# Patient Record
Sex: FEMALE | Race: BLACK OR AFRICAN AMERICAN | Employment: FULL TIME | URBAN - METROPOLITAN AREA
[De-identification: names, ages, dates, MRNs, and addresses within clinical notes are randomized per-mention and may not be internally consistent; named-entity substitution may affect disease eponyms.]

---

## 2017-02-21 ENCOUNTER — OFFICE VISIT (OUTPATIENT)
Dept: FAMILY MEDICINE CLINIC | Age: 37
End: 2017-02-21

## 2017-02-21 VITALS
RESPIRATION RATE: 16 BRPM | TEMPERATURE: 98.7 F | SYSTOLIC BLOOD PRESSURE: 124 MMHG | WEIGHT: 151 LBS | BODY MASS INDEX: 26.75 KG/M2 | HEIGHT: 63 IN | HEART RATE: 72 BPM | DIASTOLIC BLOOD PRESSURE: 82 MMHG | OXYGEN SATURATION: 99 %

## 2017-02-21 DIAGNOSIS — R05.9 COUGH: ICD-10-CM

## 2017-02-21 DIAGNOSIS — R49.0 HOARSENESS: ICD-10-CM

## 2017-02-21 DIAGNOSIS — J02.9 SORE THROAT: Primary | ICD-10-CM

## 2017-02-21 LAB
S PYO AG THROAT QL: POSITIVE
VALID INTERNAL CONTROL?: YES

## 2017-02-21 RX ORDER — AMOXICILLIN AND CLAVULANATE POTASSIUM 875; 125 MG/1; MG/1
1 TABLET, FILM COATED ORAL 2 TIMES DAILY
Qty: 20 TAB | Refills: 0 | Status: SHIPPED | OUTPATIENT
Start: 2017-02-21 | End: 2017-03-03

## 2017-02-21 NOTE — PROGRESS NOTES
HISTORY OF PRESENT ILLNESS  Tiara Fernández is a 39 y.o. female. HPI: here with c/o feeling sick since last 2 wks. Initially was sinus congestion then started with cough with greenish sputum. Said her headaches, sinus congestion and cough is better but feeling discomfort in the throat. No pain. Was taking allergy medication otc was helping. No trouble swallowing. Mean time she has lost her voice and also missed couple of days of work. Still feels some horseness but some improvement. Denies any fever. No nausea or vomiting. No abdominal pain. No urine or bowel complains. No sick contact. Not had flu shot this year. Visit Vitals    /82 (BP 1 Location: Left arm, BP Patient Position: Sitting)    Pulse 72    Temp 98.7 °F (37.1 °C) (Oral)    Resp 16    Ht 5' 3\" (1.6 m)    Wt 151 lb (68.5 kg)    SpO2 99%    BMI 26.75 kg/m2         ROS: see HPI     Physical Exam   Constitutional: She is oriented to person, place, and time. No distress. HENT:   Throat: rt tonsillar exudate. Neck: no palpable lymph nodes   Face: no sinus tenderness, swelling or redness    Cardiovascular: Normal heart sounds. Pulmonary/Chest: No respiratory distress. She has no wheezes. Abdominal: Soft. There is no tenderness. Neurological: She is oriented to person, place, and time. ASSESSMENT and PLAN    ICD-10-CM ICD-9-CM    1. Sore throat: rapid strep positive. Rt side tonsillar exudate. For now giving augmentin to take it with food or probiotic. F/u next week. J02.9 462 amoxicillin-clavulanate (AUGMENTIN) 875-125 mg per tablet   2. Hoarseness: symptomatic treatment. R49.0 784.42 AMB POC RAPID STREP A   3. Cough: improved. R05 786.2 AMB POC RAPID STREP A   Pt understood and agrees with above plan. Review    Follow-up Disposition:  Return in about 1 week (around 2/28/2017), or if symptoms worsen or fail to improve.

## 2017-02-21 NOTE — PROGRESS NOTES
1. Have you been to the ER, urgent care clinic since your last visit? Hospitalized since your last visit? Patient had  in 17 in [place], [place].     2. Have you seen or consulted any other health care providers outside of the [facility] since your last visit? Include any pap smears or colon screening. No    Patient declined flu vaccine.

## 2017-02-21 NOTE — PATIENT INSTRUCTIONS

## 2017-02-21 NOTE — MR AVS SNAPSHOT
Visit Information Date & Time Provider Department Dept. Phone Encounter #  
 2/21/2017  8:30 AM Anival Kwok, [address] [id number] Follow-up Instructions Return in about 1 week (around 2/28/2017), or if symptoms worsen or fail to improve. Upcoming Health Maintenance Date Due INFLUENZA AGE 9 TO ADULT 8/1/2016 PAP AKA CERVICAL CYTOLOGY 2/17/2019 DTaP/Tdap/Td series (2 - Td) 8/27/2020 Allergies as of 2/21/2017  Review Complete On: 2/21/2017 By: Anival Kwok MD  
  
 Severity Noted Reaction Type Reactions Adhesive  05/13/2015    Other (comments) Per patient adhesive left scar Current Immunizations  Never Reviewed No immunizations on file. Not reviewed this visit You Were Diagnosed With   
  
 Codes Comments Sore throat    -  Primary ICD-10-CM: J02.9 ICD-9-CM: 468 Hoarseness     ICD-10-CM: R49.0 ICD-9-CM: 784.42 Cough     ICD-10-CM: R05 ICD-9-CM: 698. 2 Vitals BP Pulse Temp Resp Height(growth percentile) Weight(growth percentile) 124/82 (BP 1 Location: Left arm, BP Patient Position: Sitting) 72 98.7 °F (37.1 °C) (Oral) 16 5' 3\" (1.6 m) 151 lb (68.5 kg) LMP SpO2 BMI OB Status Smoking Status 02/02/2017 99% 26.75 kg/m2 Having regular periods Never Smoker Vitals History BMI and BSA Data Body Mass Index Body Surface Area  
 26.75 kg/m 2 1.74 m 2 Preferred Pharmacy Pharmacy Name Phone [facility] [phone] [facility] [place] [address] AT [address] [phone] Your Updated Medication List  
  
   
This list is accurate as of: 2/21/17  9:12 AM.  Always use your most recent med list.  
  
  
  
  
 amoxicillin-clavulanate 875-125 mg per tablet Commonly known as:  AUGMENTIN Take 1 Tab by mouth two (2) times a day for 10 days. Pedi Multivit No.7-Folic Acid 935 mcg Chew Take  by mouth. Prescriptions Sent to Pharmacy Refills  
 amoxicillin-clavulanate (AUGMENTIN) 875-125 mg per tablet 0 Sig: Take 1 Tab by mouth two (2) times a day for 10 days. Class: Normal  
 Pharmacy: [facility] [address] [place] [address] AT [address] Ph #: [phone] Route: Oral  
  
We Performed the Following AMB POC RAPID STREP A [03434 CPT(R)] Follow-up Instructions Return in about 1 week (around 2/28/2017), or if symptoms worsen or fail to improve. Patient Instructions Strep Throat: Care Instructions Your Care Instructions Strep throat is a bacterial infection that causes sudden, severe sore throat and fever. Strep throat, which is caused by bacteria called streptococcus, is treated with antibiotics. Sometimes a strep test is necessary to tell if the sore throat is caused by strep bacteria. Treatment can help ease symptoms and may prevent future problems. Follow-up care is a key part of your treatment and safety. Be sure to make and go to all appointments, and call your doctor if you are having problems. It's also a good idea to know your test results and keep a list of the medicines you take. How can you care for yourself at home? · Take your antibiotics as directed. Do not stop taking them just because you feel better. You need to take the full course of antibiotics. · Strep throat can spread to others until 24 hours after you begin taking antibiotics. During this time, you should avoid contact with other people at work or home, especially infants and children. Do not sneeze or cough on others, and wash your hands often. Keep your drinking glass and eating utensils separate from those of others, and wash these items well in hot, soapy water. · Gargle with warm salt water at least once each hour to help reduce swelling and make your throat feel better. Use 1 teaspoon of salt mixed in 8 fluid ounces of warm water. · Take an over-the-counter pain medication, such as acetaminophen (Tylenol), ibuprofen (Advil, Motrin), or naproxen (Aleve). Read and follow all instructions on the label. · Try an over-the-counter anesthetic throat spray or throat lozenges, which may help relieve throat pain. · Drink plenty of fluids. Fluids may help soothe an irritated throat. Hot fluids, such as tea or soup, may help your throat feel better. · Eat soft solids and drink plenty of clear liquids. Flavored ice pops, ice cream, scrambled eggs, sherbet, and gelatin dessert (such as Jell-O) may also soothe the throat. · Get lots of rest. 
· Do not smoke, and avoid secondhand smoke. If you need help quitting, talk to your doctor about stop-smoking programs and medicines. These can increase your chances of quitting for good. · Use a vaporizer or humidifier to add moisture to the air in your bedroom. Follow the directions for cleaning the machine. When should you call for help? Call your doctor now or seek immediate medical care if: 
· You have a new or higher fever. · You have a fever with a stiff neck or severe headache. · You have new or worse trouble swallowing. · Your sore throat gets much worse on one side. · Your pain becomes much worse on one side of your throat. Watch closely for changes in your health, and be sure to contact your doctor if: 
· You are not getting better after 2 days (48 hours). · You do not get better as expected. Where can you learn more? Go to http://more-wilson.info/. Enter K625 in the search box to learn more about \"Strep Throat: Care Instructions. \" Current as of: July 29, 2016 Content Version: 11.1 © 3896-2182 [employer]. Care instructions adapted under license by [employer] (which disclaims liability or warranty for this information).  If you have questions about a medical condition or this instruction, always ask your healthcare professional. [employer] disclaims any warranty or liability for your use of this information. Introducing [facility]! Frank Harrell introduces [employer] patient portal. Now you can access parts of your medical record, email your doctor's office, and request medication refills online. 1. In your internet browser, go to [email or web address] 2. Click on the First Time User? Click Here link in the Sign In box. You will see the New Member Sign Up page. 3. Enter your [employer] Access Code exactly as it appears below. You will not need to use this code after youve completed the sign-up process. If you do not sign up before the expiration date, you must request a new code. · [employer] Access Code: [email or web address] Expires: 5/22/2017  9:12 AM 
 
4. Enter the last four digits of your Social Security Number (xxxx) and Date of Birth (mm/dd/yyyy) as indicated and click Submit. You will be taken to the next sign-up page. 5. Create a [employer] ID. This will be your [employer] login ID and cannot be changed, so think of one that is secure and easy to remember. 6. Create a [employer] password. You can change your password at any time. 7. Enter your Password Reset Question and Answer. This can be used at a later time if you forget your password. 8. Enter your e-mail address. You will receive e-mail notification when new information is available in [address] 9. Click Sign Up. You can now view and download portions of your medical record. 10. Click the Download Summary menu link to download a portable copy of your medical information. If you have questions, please visit the Frequently Asked Questions section of the [employer] website. Remember, [employer] is NOT to be used for urgent needs. For medical emergencies, dial 911. Now available from your iPhone and Android! Please provide this summary of care documentation to your next provider. Your primary care clinician is listed as Heidi Salamanca. If you have any questions after today's visit, please call [phone].

## 2017-12-05 ENCOUNTER — OFFICE VISIT (OUTPATIENT)
Dept: FAMILY MEDICINE CLINIC | Age: 37
End: 2017-12-05

## 2017-12-05 VITALS
HEIGHT: 63 IN | BODY MASS INDEX: 28.24 KG/M2 | OXYGEN SATURATION: 97 % | WEIGHT: 159.4 LBS | DIASTOLIC BLOOD PRESSURE: 76 MMHG | HEART RATE: 86 BPM | TEMPERATURE: 98.1 F | SYSTOLIC BLOOD PRESSURE: 104 MMHG | RESPIRATION RATE: 16 BRPM

## 2017-12-05 DIAGNOSIS — J04.0 LARYNGITIS: ICD-10-CM

## 2017-12-05 DIAGNOSIS — N92.0 MENORRHAGIA WITH REGULAR CYCLE: ICD-10-CM

## 2017-12-05 DIAGNOSIS — J02.9 SORE THROAT: Primary | ICD-10-CM

## 2017-12-05 DIAGNOSIS — R09.81 SINUS CONGESTION: ICD-10-CM

## 2017-12-05 DIAGNOSIS — N89.8 VAGINAL ODOR: ICD-10-CM

## 2017-12-05 LAB
S PYO AG THROAT QL: POSITIVE
VALID INTERNAL CONTROL?: YES

## 2017-12-05 RX ORDER — AMOXICILLIN AND CLAVULANATE POTASSIUM 875; 125 MG/1; MG/1
1 TABLET, FILM COATED ORAL 2 TIMES DAILY
Qty: 20 TAB | Refills: 0 | Status: SHIPPED | OUTPATIENT
Start: 2017-12-05 | End: 2017-12-15

## 2017-12-05 NOTE — PROGRESS NOTES
1. Have you been to the ER, urgent care clinic since your last visit? Hospitalized since your last visit? No    2. Have you seen or consulted any other health care providers outside of the [address] [place] [address] since your last visit? Include any pap smears or colon screening. No    Patient declined flu vaccine.

## 2017-12-05 NOTE — PATIENT INSTRUCTIONS
Sore Throat: Care Instructions  Your Care Instructions    Infection by bacteria or a virus causes most sore throats. Cigarette smoke, dry air, air pollution, allergies, and yelling can also cause a sore throat. Sore throats can be painful and annoying. Fortunately, most sore throats go away on their own. If you have a bacterial infection, your doctor may prescribe antibiotics. Follow-up care is a key part of your treatment and safety. Be sure to make and go to all appointments, and call your doctor if you are having problems. It's also a good idea to know your test results and keep a list of the medicines you take. How can you care for yourself at home? · If your doctor prescribed antibiotics, take them as directed. Do not stop taking them just because you feel better. You need to take the full course of antibiotics. · Gargle with warm salt water once an hour to help reduce swelling and relieve discomfort. Use 1 teaspoon of salt mixed in 1 cup of warm water. · Take an over-the-counter pain medicine, such as acetaminophen (Tylenol), ibuprofen (Advil, Motrin), or naproxen (Aleve). Read and follow all instructions on the label. · Be careful when taking over-the-counter cold or flu medicines and Tylenol at the same time. Many of these medicines have acetaminophen, which is Tylenol. Read the labels to make sure that you are not taking more than the recommended dose. Too much acetaminophen (Tylenol) can be harmful. · Drink plenty of fluids. Fluids may help soothe an irritated throat. Hot fluids, such as tea or soup, may help decrease throat pain. · Use over-the-counter throat lozenges to soothe pain. Regular cough drops or hard candy may also help. These should not be given to young children because of the risk of choking. · Do not smoke or allow others to smoke around you. If you need help quitting, talk to your doctor about stop-smoking programs and medicines.  These can increase your chances of quitting for good. · Use a vaporizer or humidifier to add moisture to your bedroom. Follow the directions for cleaning the machine. When should you call for help? Call your doctor now or seek immediate medical care if:  ? · You have new or worse trouble swallowing. ? · Your sore throat gets much worse on one side. ? Watch closely for changes in your health, and be sure to contact your doctor if you do not get better as expected. Where can you learn more? Go to http://more-wilson.info/. Enter 062 [id number] in the search box to learn more about \"Sore Throat: Care Instructions. \"  Current as of: May 12, 2017  Content Version: 11.4  © 8031-2836 [employer]. Care instructions adapted under license by [employer] (which disclaims liability or warranty for this information). If you have questions about a medical condition or this instruction, always ask your healthcare professional. [place] [address] any warranty or liability for your use of this information. Heavy Menstrual Periods: Care Instructions  Your Care Instructions    Many women get heavy menstrual periods and painful cramps. For some women, this means passing large blood clots and changing sanitary pads or tampons often. You may also have periods that last longer than 7 days. A change in hormones or an irritation in the uterus can cause heavy bleeding. Women who are overweight are more likely to have heavy menstrual periods. But there may not be a specific cause for your heavy menstrual periods. Your doctor may recommend hormone treatments to slow or stop your periods. If a fibroid (a growth that is not cancer) is causing your heavy bleeding, your doctor may recommend surgery or other treatments to remove the growth. Because blood loss from heavy menstrual periods can make you very tired and weak (anemic), your doctor may recommend that you take extra iron.   Follow-up care is a key part of your treatment and safety. Be sure to make and go to all appointments, and call your doctor if you are having problems. It's also a good idea to know your test results and keep a list of the medicines you take. How can you care for yourself at home? · Get plenty of rest.  · Keep a record of your periods. Write down when your period begins and ends and how much flow you have. That means counting the number of pads and tampons you use. Note whether they are soaked. Note any other symptoms. Take this record to your doctor appointments. · Take your medicines exactly as prescribed. Call your doctor if you think you are having a problem with your medicine. · Take pain medicines exactly as directed. ¨ If the doctor gave you a prescription medicine for pain, take it as prescribed. ¨ If you are not taking a prescription pain medicine, ask your doctor if you can take an over-the-counter medicine. · Try to reach a healthy weight. If you are trying to lose weight, do it slowly with your doctor's advice. · If you are taking iron pills:  ¨ Try to take the pills about 1 hour before or 2 hours after meals. But you may need to take iron with some food to avoid an upset stomach. ¨ Vitamin C (from food or pills) helps your body absorb iron. Try taking iron pills with a glass of orange juice or other citrus fruit juice. ¨ Do not take antacids or drink milk or caffeine drinks (such as coffee, tea, or cola) at the same time or within 2 hours of the time that you take your iron. They can make it hard for your body to absorb the iron. ¨ Iron pills may cause stomach problems, such as heartburn, nausea, diarrhea, constipation, and cramps. Be sure to drink plenty of fluids, and include fruits, vegetables, and fiber in your diet each day. ¨ If you forget to take an iron pill, do not take a double dose of iron the next time you take a pill. ¨ Keep iron pills out of the reach of small children. An overdose of iron can be very dangerous.   When should you call for help? Call 911 anytime you think you may need emergency care. For example, call if:  ? · You passed out (lost consciousness). ?Call your doctor now or seek immediate medical care if:  ? · You have new or worse belly or pelvic pain. ? · You have severe vaginal bleeding. ? · You feel dizzy or lightheaded, or you feel like you may faint. ? Watch closely for changes in your health, and be sure to contact your doctor if:  ? · You think you may be pregnant. ? · Your bleeding gets worse. ? · You do not get better as expected. Where can you learn more? Go to http://more-wilson.info/. Enter F477 in the search box to learn more about \"Heavy Menstrual Periods: Care Instructions. \"  Current as of: October 13, 2016  Content Version: 11.4  © 0165-0387 [employer]. Care instructions adapted under license by [employer] (which disclaims liability or warranty for this information). If you have questions about a medical condition or this instruction, always ask your healthcare professional. [address] any warranty or liability for your use of this information.

## 2017-12-05 NOTE — MR AVS SNAPSHOT
Visit Information Date & Time Provider Department Dept. Phone Encounter #  
 12/5/2017  1:30 PM Sofy Dunham, [address] [id number] Follow-up Instructions Return in about 2 weeks (around 12/19/2017), or if symptoms worsen or fail to improve. Upcoming Health Maintenance Date Due  
 PAP AKA CERVICAL CYTOLOGY 2/17/2019 DTaP/Tdap/Td series (2 - Td) 8/27/2020 Allergies as of 12/5/2017  Review Complete On: 12/5/2017 By: Sofy Dunham MD  
  
 Severity Noted Reaction Type Reactions Adhesive  05/13/2015    Other (comments) Per patient adhesive left scar Current Immunizations  Never Reviewed No immunizations on file. Not reviewed this visit You Were Diagnosed With   
  
 Codes Comments Sore throat    -  Primary ICD-10-CM: J02.9 ICD-9-CM: 462 Laryngitis     ICD-10-CM: J04.0 ICD-9-CM: 464.00 Sinus congestion     ICD-10-CM: R09.81 ICD-9-CM: 478.19 Vaginal odor     ICD-10-CM: N89.8 ICD-9-CM: 625.8 Menorrhagia with regular cycle     ICD-10-CM: N92.0 ICD-9-CM: 626.2 Vitals BP Pulse Temp Resp Height(growth percentile) Weight(growth percentile) 104/76 (BP 1 Location: Left arm, BP Patient Position: Sitting) 86 98.1 °F (36.7 °C) (Oral) 16 5' 3\" (1.6 m) 159 lb 6.4 oz (72.3 kg) LMP SpO2 BMI OB Status Smoking Status 11/16/2017 97% 28.24 kg/m2 Having regular periods Never Smoker Vitals History BMI and BSA Data Body Mass Index Body Surface Area  
 28.24 kg/m 2 1.79 m 2 Preferred Pharmacy Pharmacy Name Phone [phone] - [place], [address] AT [address] [phone] Your Updated Medication List  
  
   
This list is accurate as of: 12/5/17  2:07 PM.  Always use your most recent med list.  
  
  
  
  
 amoxicillin-clavulanate 875-125 mg per tablet Commonly known as:  AUGMENTIN  
 Take 1 Tab by mouth two (2) times a day for 10 days. APPLE CIDER VINEGAR PO Take  by mouth. BIOTIN PO Take  by mouth. Biotin with iron Children's ZyrTEC Allergy 10 mg Tbdi Generic drug:  cetirizine Take  by mouth. Pedi Multivit No.7-Folic Acid 343 mcg Chew Take  by mouth. Prescriptions Sent to Pharmacy Refills  
 amoxicillin-clavulanate (AUGMENTIN) 875-125 mg per tablet 0 Sig: Take 1 Tab by mouth two (2) times a day for 10 days. Class: Normal  
 Pharmacy: [facility] [address] [place] [address] AT [address] Ph #: [phone] Route: Oral  
  
We Performed the Following AMB POC RAPID STREP A [23761 CPT(R)] REFERRAL TO OBSTETRICS AND GYNECOLOGY [REF51 Custom] Follow-up Instructions Return in about 2 weeks (around 12/19/2017), or if symptoms worsen or fail to improve. Referral Information Referral ID Referred By Referred To  
  
 [id number] [address] [place] [address]. [facility] [facility] [address] Phone: [phone] Fax: [phone] Visits Status Start Date End Date 1 New Request 12/5/17 12/5/18 If your referral has a status of pending review or denied, additional information will be sent to support the outcome of this decision. Patient Instructions Sore Throat: Care Instructions Your Care Instructions Infection by bacteria or a virus causes most sore throats. Cigarette smoke, dry air, air pollution, allergies, and yelling can also cause a sore throat. Sore throats can be painful and annoying. Fortunately, most sore throats go away on their own. If you have a bacterial infection, your doctor may prescribe antibiotics. Follow-up care is a key part of your treatment and safety.  Be sure to make and go to all appointments, and call your doctor if you are having problems. It's also a good idea to know your test results and keep a list of the medicines you take. How can you care for yourself at home? · If your doctor prescribed antibiotics, take them as directed. Do not stop taking them just because you feel better. You need to take the full course of antibiotics. · Gargle with warm salt water once an hour to help reduce swelling and relieve discomfort. Use 1 teaspoon of salt mixed in 1 cup of warm water. · Take an over-the-counter pain medicine, such as acetaminophen (Tylenol), ibuprofen (Advil, Motrin), or naproxen (Aleve). Read and follow all instructions on the label. · Be careful when taking over-the-counter cold or flu medicines and Tylenol at the same time. Many of these medicines have acetaminophen, which is Tylenol. Read the labels to make sure that you are not taking more than the recommended dose. Too much acetaminophen (Tylenol) can be harmful. · Drink plenty of fluids. Fluids may help soothe an irritated throat. Hot fluids, such as tea or soup, may help decrease throat pain. · Use over-the-counter throat lozenges to soothe pain. Regular cough drops or hard candy may also help. These should not be given to young children because of the risk of choking. · Do not smoke or allow others to smoke around you. If you need help quitting, talk to your doctor about stop-smoking programs and medicines. These can increase your chances of quitting for good. · Use a vaporizer or humidifier to add moisture to your bedroom. Follow the directions for cleaning the machine. When should you call for help? Call your doctor now or seek immediate medical care if: 
? · You have new or worse trouble swallowing. ? · Your sore throat gets much worse on one side. ? Watch closely for changes in your health, and be sure to contact your doctor if you do not get better as expected. Where can you learn more? Go to http://more-wilson.info/. Enter [id number] [id number] in the search box to learn more about \"Sore Throat: Care Instructions. \" Current as of: May 12, 2017 Content Version: 11.4 © 0690-9276 [employer]. Care instructions adapted under license by [employer] (which disclaims liability or warranty for this information). If you have questions about a medical condition or this instruction, always ask your healthcare professional. [place] [address] any warranty or liability for your use of this information. Heavy Menstrual Periods: Care Instructions Your Care Instructions Many women get heavy menstrual periods and painful cramps. For some women, this means passing large blood clots and changing sanitary pads or tampons often. You may also have periods that last longer than 7 days. A change in hormones or an irritation in the uterus can cause heavy bleeding. Women who are overweight are more likely to have heavy menstrual periods. But there may not be a specific cause for your heavy menstrual periods. Your doctor may recommend hormone treatments to slow or stop your periods. If a fibroid (a growth that is not cancer) is causing your heavy bleeding, your doctor may recommend surgery or other treatments to remove the growth. Because blood loss from heavy menstrual periods can make you very tired and weak (anemic), your doctor may recommend that you take extra iron. Follow-up care is a key part of your treatment and safety. Be sure to make and go to all appointments, and call your doctor if you are having problems. It's also a good idea to know your test results and keep a list of the medicines you take. How can you care for yourself at home? · Get plenty of rest. 
· Keep a record of your periods. Write down when your period begins and ends and how much flow you have. That means counting the number of pads and tampons you use. Note whether they are soaked.  Note any other symptoms. Take this record to your doctor appointments. · Take your medicines exactly as prescribed. Call your doctor if you think you are having a problem with your medicine. · Take pain medicines exactly as directed. ¨ If the doctor gave you a prescription medicine for pain, take it as prescribed. ¨ If you are not taking a prescription pain medicine, ask your doctor if you can take an over-the-counter medicine. · Try to reach a healthy weight. If you are trying to lose weight, do it slowly with your doctor's advice. · If you are taking iron pills: ¨ Try to take the pills about 1 hour before or 2 hours after meals. But you may need to take iron with some food to avoid an upset stomach. ¨ Vitamin C (from food or pills) helps your body absorb iron. Try taking iron pills with a glass of orange juice or other citrus fruit juice. ¨ Do not take antacids or drink milk or caffeine drinks (such as coffee, tea, or cola) at the same time or within 2 hours of the time that you take your iron. They can make it hard for your body to absorb the iron. ¨ Iron pills may cause stomach problems, such as heartburn, nausea, diarrhea, constipation, and cramps. Be sure to drink plenty of fluids, and include fruits, vegetables, and fiber in your diet each day. ¨ If you forget to take an iron pill, do not take a double dose of iron the next time you take a pill. ¨ Keep iron pills out of the reach of small children. An overdose of iron can be very dangerous. When should you call for help? Call 911 anytime you think you may need emergency care. For example, call if: 
? · You passed out (lost consciousness). ?Call your doctor now or seek immediate medical care if: 
? · You have new or worse belly or pelvic pain. ? · You have severe vaginal bleeding. ? · You feel dizzy or lightheaded, or you feel like you may faint. ? Watch closely for changes in your health, and be sure to contact your doctor if: ? · You think you may be pregnant. ? · Your bleeding gets worse. ? · You do not get better as expected. Where can you learn more? Go to http://more-wilson.info/. Enter F477 in the search box to learn more about \"Heavy Menstrual Periods: Care Instructions. \" Current as of: October 13, 2016 Content Version: 11.4 © 1847-6743 [employer]. Care instructions adapted under license by [employer] (which disclaims liability or warranty for this information). If you have questions about a medical condition or this instruction, always ask your healthcare professional. [address] any warranty or liability for your use of this information. Introducing [facility]! Herman Wilson introduces Spry patient portal. Now you can access parts of your medical record, email your doctor's office, and request medication refills online. 1. In your internet browser, go to [email or web address] 2. Click on the First Time User? Click Here link in the Sign In box. You will see the New Member Sign Up page. 3. Enter your Spry Access Code exactly as it appears below. You will not need to use this code after youve completed the sign-up process. If you do not sign up before the expiration date, you must request a new code. · Spry Access Code: [email or web address] Expires: 3/5/2018  1:49 PM 
 
4. Enter the last four digits of your Social Security Number (xxxx) and Date of Birth (mm/dd/yyyy) as indicated and click Submit. You will be taken to the next sign-up page. 5. Create a Spry ID. This will be your Spry login ID and cannot be changed, so think of one that is secure and easy to remember. 6. Create a Spry password. You can change your password at any time. 7. Enter your Password Reset Question and Answer. This can be used at a later time if you forget your password. 8. Enter your e-mail address. You will receive e-mail notification when new information is available in [address] 9. Click Sign Up. You can now view and download portions of your medical record. 10. Click the Download Summary menu link to download a portable copy of your medical information. If you have questions, please visit the Frequently Asked Questions section of the [employer] website. Remember, [employer] is NOT to be used for urgent needs. For medical emergencies, dial 911. Now available from your iPhone and Android! Please provide this summary of care documentation to your next provider. Your primary care clinician is listed as Marjorie Guerrero. If you have any questions after today's visit, please call [phone].

## 2017-12-05 NOTE — PROGRESS NOTES
HISTORY OF PRESENT ILLNESS  Chloe Leonard is a 44 y.o. female. HPI: Here with c/o hoarseness of voice, throat discomfort, sinus congestion. Going on since November on and off. Taken nasal spray but not helping. No fever. No cough. No wheezing. No chest congestion. No sob. No abdominal pain. No nausea or vomiting. Visit Vitals    /76 (BP 1 Location: Left arm, BP Patient Position: Sitting)    Pulse 86    Temp 98.1 °F (36.7 °C) (Oral)    Resp 16    Ht 5' 3\" (1.6 m)    Wt 159 lb 6.4 oz (72.3 kg)    SpO2 97%    BMI 28.24 kg/m2     Review medication list, vitals, problem list,allergies. Also need ob/gyn referral for heavy menstrual period and recurrent on and off vagina odor. No vagnial discharge or discomfort at this time but it is present on and off since 2010 since her last child birth. ROS: see HPI     Physical Exam   Constitutional: She is oriented to person, place, and time. No distress. HENT:   Throat: generalize erythema, no tonsillar enlargement or exudate  Neck: no palpable lymph nodes  Face: no maxillary or frontal sinus tenderness, swelling or redness. Cardiovascular: Normal heart sounds. Pulmonary/Chest: No respiratory distress. She has no wheezes. Abdominal: Soft. There is no tenderness. Neurological: She is oriented to person, place, and time. ASSESSMENT and PLAN    ICD-10-CM ICD-9-CM    1. Sore throat: rapid strep positive. Given aumgentin. Discussed to take it with food and probiotic. F/u as needed or early if no improvement in symptoms. J02.9 462 AMB POC RAPID STREP A      amoxicillin-clavulanate (AUGMENTIN) 875-125 mg per tablet   2. Laryngitis: see above  J04.0 464.00    3. Sinus congestion: symptomatic treatment. R09.81 478.19    4. Vaginal odor: on and off. Sending her to ob/gyn for further evaluation. N89.8 625.8 REFERRAL TO OBSTETRICS AND GYNECOLOGY   5. Menorrhagia with regular cycle: sending her to ob/gyn as she requested for further plan.   has vasectomy. ? Going back on ops. She wants to discuss it with ob/gyn  N92.0 626.2 REFERRAL TO OBSTETRICS AND GYNECOLOGY   Pt understood and agree with the plan   Review HM   Follow-up Disposition:  Return in about 2 weeks (around 12/19/2017), or if symptoms worsen or fail to improve.

## 2020-07-28 ENCOUNTER — TELEPHONE (OUTPATIENT)
Dept: FAMILY MEDICINE CLINIC | Age: 40
End: 2020-07-28

## 2020-07-29 ENCOUNTER — OFFICE VISIT (OUTPATIENT)
Dept: FAMILY MEDICINE CLINIC | Age: 40
End: 2020-07-29

## 2020-07-29 VITALS
BODY MASS INDEX: 29.59 KG/M2 | HEIGHT: 63 IN | TEMPERATURE: 98.3 F | SYSTOLIC BLOOD PRESSURE: 96 MMHG | DIASTOLIC BLOOD PRESSURE: 64 MMHG | RESPIRATION RATE: 16 BRPM | OXYGEN SATURATION: 99 % | WEIGHT: 167 LBS | HEART RATE: 64 BPM

## 2020-07-29 DIAGNOSIS — Z00.00 WELL WOMAN EXAM (NO GYNECOLOGICAL EXAM): Primary | ICD-10-CM

## 2020-07-29 DIAGNOSIS — Z86.2 H/O SICKLE CELL TRAIT: ICD-10-CM

## 2020-07-29 DIAGNOSIS — F41.9 ANXIETY: ICD-10-CM

## 2020-07-29 DIAGNOSIS — E55.9 VITAMIN D DEFICIENCY: ICD-10-CM

## 2020-07-29 DIAGNOSIS — Z13.220 SCREENING FOR HYPERLIPIDEMIA: ICD-10-CM

## 2020-07-29 DIAGNOSIS — Z13.1 SCREENING FOR DIABETES MELLITUS: ICD-10-CM

## 2020-07-29 DIAGNOSIS — Z23 NEED FOR TDAP VACCINATION: ICD-10-CM

## 2020-07-29 DIAGNOSIS — N92.0 MENORRHAGIA WITH REGULAR CYCLE: ICD-10-CM

## 2020-07-29 DIAGNOSIS — M62.08 DIASTASIS RECTI: ICD-10-CM

## 2020-07-29 DIAGNOSIS — K30 INDIGESTION: ICD-10-CM

## 2020-07-29 DIAGNOSIS — Z12.39 SCREENING FOR BREAST CANCER: ICD-10-CM

## 2020-07-29 RX ORDER — PANTOPRAZOLE SODIUM 20 MG/1
20 TABLET, DELAYED RELEASE ORAL DAILY
Qty: 30 TAB | Refills: 1 | Status: SHIPPED | OUTPATIENT
Start: 2020-07-29 | End: 2022-07-25

## 2020-07-29 RX ORDER — BISMUTH SUBSALICYLATE 262 MG
1 TABLET,CHEWABLE ORAL DAILY
COMMUNITY
End: 2021-11-10

## 2020-07-29 NOTE — PROGRESS NOTES
Chief Complaint   Patient presents with    Well Woman     No pap _ had @ [facility] in [place] 2 weeks ago    Hiatal Hernia    Abdominal Pain    Indigestion    Weight Management

## 2020-07-29 NOTE — PATIENT INSTRUCTIONS
Well Visit, Ages 25 to 48: Care Instructions  Your Care Instructions     Physical exams can help you stay healthy. Your doctor has checked your overall health and may have suggested ways to take good care of yourself. He or she also may have recommended tests. At home, you can help prevent illness with healthy eating, regular exercise, and other steps. Follow-up care is a key part of your treatment and safety. Be sure to make and go to all appointments, and call your doctor if you are having problems. It's also a good idea to know your test results and keep a list of the medicines you take. How can you care for yourself at home? · Reach and stay at a healthy weight. This will lower your risk for many problems, such as obesity, diabetes, heart disease, and high blood pressure. · Get at least 30 minutes of physical activity on most days of the week. Walking is a good choice. You also may want to do other activities, such as running, swimming, cycling, or playing tennis or team sports. Discuss any changes in your exercise program with your doctor. · Do not smoke or allow others to smoke around you. If you need help quitting, talk to your doctor about stop-smoking programs and medicines. These can increase your chances of quitting for good. · Talk to your doctor about whether you have any risk factors for sexually transmitted infections (STIs). Having one sex partner (who does not have STIs and does not have sex with anyone else) is a good way to avoid these infections. · Use birth control if you do not want to have children at this time. Talk with your doctor about the choices available and what might be best for you. · Protect your skin from too much sun. When you're outdoors from 10 a.m. to 4 p.m., stay in the shade or cover up with clothing and a hat with a wide brim. Wear sunglasses that block UV rays. Even when it's cloudy, put broad-spectrum sunscreen (SPF 30 or higher) on any exposed skin.   · See a dentist one or two times a year for checkups and to have your teeth cleaned. · Wear a seat belt in the car. Follow your doctor's advice about when to have certain tests. These tests can spot problems early. For everyone  · Cholesterol. Have the fat (cholesterol) in your blood tested after age 21. Your doctor will tell you how often to have this done based on your age, family history, or other things that can increase your risk for heart disease. · Blood pressure. Have your blood pressure checked during a routine doctor visit. Your doctor will tell you how often to check your blood pressure based on your age, your blood pressure results, and other factors. · Vision. Talk with your doctor about how often to have a glaucoma test.  · Diabetes. Ask your doctor whether you should have tests for diabetes. · Colon cancer. Your risk for colorectal cancer gets higher as you get older. Some experts say that adults should start regular screening at age 48 and stop at age 76. Others say to start before age 48 or continue after age 76. Talk with your doctor about your risk and when to start and stop screening. For women  · Breast exam and mammogram. Talk to your doctor about when you should have a clinical breast exam and a mammogram. Medical experts differ on whether and how often women under 50 should have these tests. Your doctor can help you decide what is right for you. · Cervical cancer screening test and pelvic exam. Begin with a Pap test at age 24. The test often is part of a pelvic exam. Starting at age 27, you may choose to have a Pap test, an HPV test, or both tests at the same time (called co-testing). Talk with your doctor about how often to have testing. · Tests for sexually transmitted infections (STIs). Ask whether you should have tests for STIs. You may be at risk if you have sex with more than one person, especially if your partners do not wear condoms.   For men  · Tests for sexually transmitted infections (STIs). Ask whether you should have tests for STIs. You may be at risk if you have sex with more than one person, especially if you do not wear a condom. · Testicular cancer exam. Ask your doctor whether you should check your testicles regularly. · Prostate exam. Talk to your doctor about whether you should have a blood test (called a PSA test) for prostate cancer. Experts differ on whether and when men should have this test. Some experts suggest it if you are older than 39 and are -American or have a father or brother who got prostate cancer when he was younger than 72. When should you call for help? Watch closely for changes in your health, and be sure to contact your doctor if you have any problems or symptoms that concern you. Where can you learn more? Go to http://more-wilson.info/  Enter P072 in the search box to learn more about \"Well Visit, Ages 25 to 48: Care Instructions. \"  Current as of: August 22, 2019               Content Version: 12.5  © 5692-5757 [employer]. Care instructions adapted under license by [employer] (which disclaims liability or warranty for this information). If you have questions about a medical condition or this instruction, always ask your healthcare professional. [place] [address] any warranty or liability for your use of this information. Breast Self-Exam: Care Instructions  Your Care Instructions     A breast self-exam is when you check your breasts for lumps or changes. This regular exam helps you learn how your breasts normally look and feel. Most breast problems or changes are not because of cancer. Breast self-exam is not a substitute for a mammogram. Having regular breast exams by your doctor and regular mammograms improve your chances of finding any problems with your breasts.   Some women set a time each month to do a step-by-step breast self-exam. Other women like a less formal system. They might look at their breasts as they brush their teeth, or feel their breasts once in a while in the shower. If you notice a change in your breast, tell your doctor. Follow-up care is a key part of your treatment and safety. Be sure to make and go to all appointments, and call your doctor if you are having problems. It's also a good idea to know your test results and keep a list of the medicines you take. How do you do a breast self-exam?  · The best time to examine your breasts is usually one week after your menstrual period begins. Your breasts should not be tender then. If you do not have periods, you might do your exam on a day of the month that is easy to remember. · To examine your breasts:  ? Remove all your clothes above the waist and lie down. When you are lying down, your breast tissue spreads evenly over your chest wall, which makes it easier to feel all your breast tissue. ? Use the pads--not the fingertips--of the 3 middle fingers of your left hand to check your right breast. Move your fingers slowly in small coin-sized circles that overlap. ? Use three levels of pressure to feel of all your breast tissue. Use light pressure to feel the tissue close to the skin surface. Use medium pressure to feel a little deeper. Use firm pressure to feel your tissue close to your breastbone and ribs. Use each pressure level to feel your breast tissue before moving on to the next spot. ? Check your entire breast, moving up and down as if following a strip from the collarbone to the bra line, and from the armpit to the ribs. Repeat until you have covered the entire breast.  ? Repeat this procedure for your left breast, using the pads of the 3 middle fingers of your right hand. · To examine your breasts while in the shower:  ? Place one arm over your head and lightly soap your breast on that side. ?  Using the pads of your fingers, gently move your hand over your breast (in the strip pattern described above), feeling carefully for any lumps or changes. ? Repeat for the other breast.  · Have your doctor inspect anything you notice to see if you need further testing. Where can you learn more? Go to http://www.gray.com/  Enter P148 in the search box to learn more about \"Breast Self-Exam: Care Instructions. \"  Current as of: August 22, 2019               Content Version: 12.5  © 0160-8693 [employer]. Care instructions adapted under license by [employer] (which disclaims liability or warranty for this information). If you have questions about a medical condition or this instruction, always ask your healthcare professional. [address] any warranty or liability for your use of this information. A Healthy Lifestyle: Care Instructions  Your Care Instructions     A healthy lifestyle can help you feel good, stay at a healthy weight, and have plenty of energy for both work and play. A healthy lifestyle is something you can share with your whole family. A healthy lifestyle also can lower your risk for serious health problems, such as high blood pressure, heart disease, and diabetes. You can follow a few steps listed below to improve your health and the health of your family. Follow-up care is a key part of your treatment and safety. Be sure to make and go to all appointments, and call your doctor if you are having problems. It's also a good idea to know your test results and keep a list of the medicines you take. How can you care for yourself at home? · Do not eat too much sugar, fat, or fast foods. You can still have dessert and treats now and then. The goal is moderation. · Start small to improve your eating habits. Pay attention to portion sizes, drink less juice and soda pop, and eat more fruits and vegetables. ? Eat a healthy amount of food. A 3-ounce serving of meat, for example, is about the size of a deck of cards.  Fill the rest of your plate with vegetables and whole grains. ? Limit the amount of soda and sports drinks you have every day. Drink more water when you are thirsty. ? Eat at least 5 servings of fruits and vegetables every day. It may seem like a lot, but it is not hard to reach this goal. A serving or helping is 1 piece of fruit, 1 cup of vegetables, or 2 cups of leafy, raw vegetables. Have an apple or some carrot sticks as an afternoon snack instead of a candy bar. Try to have fruits and/or vegetables at every meal.  · Make exercise part of your daily routine. You may want to start with simple activities, such as walking, bicycling, or slow swimming. Try to be active 30 to 60 minutes every day. You do not need to do all 30 to 60 minutes all at once. For example, you can exercise 3 times a day for 10 or 20 minutes. Moderate exercise is safe for most people, but it is always a good idea to talk to your doctor before starting an exercise program.  · Keep moving. Aimee Faes the lawn, work in the garden, or [place]. Take the stairs instead of the elevator at work. · If you smoke, quit. People who smoke have an increased risk for heart attack, stroke, cancer, and other lung illnesses. Quitting is hard, but there are ways to boost your chance of quitting tobacco for good. ? Use nicotine gum, patches, or lozenges. ? Ask your doctor about stop-smoking programs and medicines. ? Keep trying. In addition to reducing your risk of diseases in the future, you will notice some benefits soon after you stop using tobacco. If you have shortness of breath or asthma symptoms, they will likely get better within a few weeks after you quit. · Limit how much alcohol you drink. Moderate amounts of alcohol (up to 2 drinks a day for men, 1 drink a day for women) are okay. But drinking too much can lead to liver problems, high blood pressure, and other health problems.   Family health  If you have a family, there are many things you can do together to improve your health. · Eat meals together as a family as often as possible. · Eat healthy foods. This includes fruits, vegetables, lean meats and dairy, and whole grains. · Include your family in your fitness plan. Most people think of activities such as jogging or tennis as the way to fitness, but there are many ways you and your family can be more active. Anything that makes you breathe hard and gets your heart pumping is exercise. Here are some tips:  ? Walk to do errands or to take your child to school or the bus.  ? Go for a family bike ride after dinner instead of watching TV. Where can you learn more? Go to http://more-wilson.info/  Enter F083 in the search box to learn more about \"A Healthy Lifestyle: Care Instructions. \"  Current as of: January 31, 2020               Content Version: 12.5  © 9368-4599 [employer]. Care instructions adapted under license by [employer] (which disclaims liability or warranty for this information). If you have questions about a medical condition or this instruction, always ask your healthcare professional. [address] any warranty or liability for your use of this information.

## 2020-07-29 NOTE — PROGRESS NOTES
HISTORY OF PRESENT ILLNESS  Shaune Najjar is a 38 y.o. female. HPI here for follow-up. Has not seen since more than 2 years. History of anxiety. No panic attacks. No chest pain or palpitation. Sitting comfortable during visit. Did not appear in any acute distress. Said she does exercise and it does help. Not on any medication at this time. History of hiatal hernia, diastases recti, indigestion. Asking for restart the PPI. Agreed to give the medication refill. No nausea or vomiting. No abdominal pain. No urinary or bowel complaint. No blood in the stool. No appetite or weight changes. Said she does feel premenstrual symptoms very strongly. Pain before menstrual cycle. Not taking any medication. Following OB/GYN. Menstrual cycle has been heavy. Does not want any hormone supplement. She was  Advise endometrial ablation but she was not ready for it as it was stopping her menstrual cycle. High BMI. Trying to lose weight with exercise and diet modification. Not much success. Little bit frustrated because of that. Discussed continue healthy lifestyle. She will keep working. Visit Vitals  BP 96/64 (BP 1 Location: Left arm, BP Patient Position: Sitting)   Pulse 64   Temp 98.3 °F (36.8 °C) (Oral)   Resp 16   Ht 5' 3\" (1.6 m)   Wt 167 lb (75.8 kg)   SpO2 99%   BMI 29.58 kg/m²     Review medication list, vitals, problem list,allergies.    Lab Results   Component Value Date/Time    WBC 3.5 (L) 05/17/2016 05:27 PM    HGB 13.1 05/17/2016 05:27 PM    HCT 40.3 05/17/2016 05:27 PM    PLATELET 395 45/58/3092 05:27 PM    MCV 77.1 05/17/2016 05:27 PM     Lab Results   Component Value Date/Time    Sodium 139 05/17/2016 05:27 PM    Potassium 3.5 05/17/2016 05:27 PM    Chloride 102 05/17/2016 05:27 PM    CO2 33 (H) 05/17/2016 05:27 PM    Anion gap 4 05/17/2016 05:27 PM    Glucose 103 (H) 05/17/2016 05:27 PM    BUN 10 05/17/2016 05:27 PM    Creatinine 0.89 05/17/2016 05:27 PM    BUN/Creatinine ratio 11 (L) 05/17/2016 05:27 PM    GFR est AA >60 05/17/2016 05:27 PM    GFR est non-AA >60 05/17/2016 05:27 PM    Calcium 8.9 05/17/2016 05:27 PM    Bilirubin, total 0.6 07/13/2016 10:15 AM    Alk. phosphatase 57 07/13/2016 10:15 AM    Protein, total 8.1 07/13/2016 10:15 AM    Albumin 4.1 07/13/2016 10:15 AM    Globulin 4.0 07/13/2016 10:15 AM    A-G Ratio 1.0 07/13/2016 10:15 AM    ALT (SGPT) 21 07/13/2016 10:15 AM    AST (SGOT) 18 07/13/2016 10:15 AM     Lab Results   Component Value Date/Time    Cholesterol, total 206 (H) 07/13/2016 10:15 AM    HDL Cholesterol 71 (H) 07/13/2016 10:15 AM    LDL, calculated 119.2 (H) 07/13/2016 10:15 AM    VLDL, calculated 15.8 07/13/2016 10:15 AM    Triglyceride 79 07/13/2016 10:15 AM    CHOL/HDL Ratio 2.9 07/13/2016 10:15 AM     Lab Results   Component Value Date/Time    TSH 1.250 05/20/2015 10:00 AM     Lab Results   Component Value Date/Time    Hemoglobin A1c 5.6 07/13/2016 10:15 AM     Lab Results   Component Value Date/Time    VITAMIN D, 25-HYDROXY 21.5 (L) 05/20/2015 10:00 AM         ROS: See HPI    Physical Exam  Constitutional:       General: She is not in acute distress. Cardiovascular:      Rate and Rhythm: Normal rate and regular rhythm. Heart sounds: Normal heart sounds. Abdominal:      General: Bowel sounds are normal.      Palpations: Abdomen is soft. Tenderness: There is no abdominal tenderness. Musculoskeletal:         General: No swelling. Neurological:      Mental Status: She is oriented to person, place, and time. Psychiatric:         Behavior: Behavior normal.         ASSESSMENT and PLAN    ICD-10-CM ICD-9-CM    1. Diastasis recti: At this time sending to GI as she is having on and off indigestion and GERD symptoms. Restarting Protonix. Advised avoid spicy and fried diet. M62.08 728.84 REFERRAL TO GASTROENTEROLOGY   2. Indigestion  K30 536.8 pantoprazole (PROTONIX) 20 mg tablet      REFERRAL TO GASTROENTEROLOGY   3.  H/O sickle cell trait: History of sickle cell trait. Will recheck labs. Z86.2 V12.3    4. Vitamin D deficiency: On multivitamin. Will recheck labs E55.9 268.9    5. Anxiety: Fairly stable at this time. Will continue current plan and observe for now. F41.9 300.00     exercise helps     6. BMI 29.0-29.9,adult: Working on healthy lifestyle. Doing her routine exercise and diet modification. Will continue current plan and observe for now. V24.02 V85.25    Pt understood and agree with the plan   Follow-up and Dispositions    · Return in about 6 months (around 1/29/2021). Review HM     Subjective:   38 y.o. female for Well Woman Check. Her gyne and breast care is done elsewhere by her Ob-Gyne physician. OB/GYN at [facility] at [place]. Done Pap smear 2 weeks ago. No prior history of abnormal Pap smear. Also clinical breast exam done by them. She was given mammogram ordered today. I do not have records from last year mammogram.  She said it was done at some breast center at the [place] she does not have information about that. Reviewed through the care everywhere which I could not find any results. No concern on self breast exam.  No pelvic pain or vaginal discharge. Has menorrhagia. Regular cycle. Following OB/GYN. Said she was offered endometrial ablation but she did not wanted it. Feels fatigued on and off. See other note for further detail      Patient Active Problem List    Diagnosis Date Noted    Diastasis recti 07/29/2020    H/O sickle cell trait 07/29/2020    Indigestion 07/29/2020    Prediabetes 06/15/2015    H/O abnormal mammogram/ repeat in aug 2015 05/13/2015    H/O hiatal hernia 05/13/2015    Oral contraceptive use 05/13/2015    Other constipation 05/13/2015     Current Outpatient Medications   Medication Sig Dispense Refill    multivitamin (ONE A DAY) tablet Take 1 Tab by mouth daily.  pantoprazole (PROTONIX) 20 mg tablet Take 1 Tab by mouth daily.  30 Tab 1    cetirizine (CHILDREN'S ZYRTEC ALLERGY) 10 mg TbDi Take  by mouth.  BIOTIN PO Take  by mouth. Biotin with iron      APPLE CIDER VINEGAR PO Take  by mouth. Allergies   Allergen Reactions    Adhesive Other (comments)     Per patient adhesive left scar     No past medical history on file. Past Surgical History:   Procedure Laterality Date    HX  SECTION       Family History   Problem Relation Age of Onset    Drug Abuse Father     Hypertension Mother     Diabetes Maternal Grandmother     Diabetes Paternal Grandmother             ROS: Feeling generally well. No TIA's or unusual headaches, no dysphagia. No prolonged cough. No dyspnea or chest pain on exertion. No abdominal pain, change in bowel habits, black or bloody stools. No urinary tract symptoms. No new or unusual musculoskeletal symptoms. Specific concerns today: See other note from today    Objective: The patient appears well, alert, oriented x 3, in no distress. Visit Vitals  BP 96/64 (BP 1 Location: Left arm, BP Patient Position: Sitting)   Pulse 64   Temp 98.3 °F (36.8 °C) (Oral)   Resp 16   Ht 5' 3\" (1.6 m)   Wt 167 lb (75.8 kg)   SpO2 99%   BMI 29.58 kg/m²     ENT normal.  Neck supple. No adenopathy or thyromegaly. NATALIA. Lungs are clear, good air entry, no wheezes, rhonchi or rales. S1 and S2 normal, no murmurs, regular rate and rhythm. Abdomen soft without tenderness, guarding, mass or organomegaly. Extremities show no edema, normal peripheral pulses. Neurological is normal, no focal findings. Breast and Pelvic exams are deferred. Assessment/Plan:       ICD-10-CM ICD-9-CM    1. Screening for breast cancer  Z12.39 V76.10 CATHERINE MAMMO BI SCREENING INCL CAD   2. Screening for diabetes mellitus  Z13.1 V77.1 HEMOGLOBIN A1C WITH EAG   3. Screening for hyperlipidemia  Z13.220 V77.91 LIPID PANEL   4. Need for Tdap vaccination  Z23 V06.1 CANCELED: TETANUS, DIPHTHERIA TOXOIDS AND ACELLULAR PERTUSSIS VACCINE (TDAP), IN INDIVIDS. >=7, IM   5.  BMI 29.0-29.9,adult  Z68.29 V85.25 6. Well woman exam (no gynecological exam)  Z00.00 V70.0     [V70.0]   Patient understood and agree with above plan. She is almost due for Tdap but she wants to confirm with the insurance before she gets sick so it was postponed. Follow-up and Dispositions    · Return in about 6 months (around 1/29/2021).

## 2021-11-10 ENCOUNTER — OFFICE VISIT (OUTPATIENT)
Dept: FAMILY MEDICINE CLINIC | Age: 41
End: 2021-11-10
Payer: COMMERCIAL

## 2021-11-10 ENCOUNTER — HOSPITAL ENCOUNTER (OUTPATIENT)
Dept: LAB | Age: 41
Discharge: HOME | End: 2021-11-10
Payer: COMMERCIAL

## 2021-11-10 VITALS
TEMPERATURE: 96.9 F | DIASTOLIC BLOOD PRESSURE: 80 MMHG | HEART RATE: 76 BPM | SYSTOLIC BLOOD PRESSURE: 124 MMHG | HEIGHT: 64 IN | BODY MASS INDEX: 29.6 KG/M2 | WEIGHT: 173.4 LBS | OXYGEN SATURATION: 98 % | RESPIRATION RATE: 16 BRPM

## 2021-11-10 DIAGNOSIS — Z13.1 SCREENING FOR DIABETES MELLITUS: ICD-10-CM

## 2021-11-10 DIAGNOSIS — D72.819 LEUKOPENIA, UNSPECIFIED TYPE: ICD-10-CM

## 2021-11-10 DIAGNOSIS — E55.9 VITAMIN D DEFICIENCY: ICD-10-CM

## 2021-11-10 DIAGNOSIS — R53.83 OTHER FATIGUE: ICD-10-CM

## 2021-11-10 DIAGNOSIS — E78.5 DYSLIPIDEMIA: ICD-10-CM

## 2021-11-10 DIAGNOSIS — Z13.220 SCREENING FOR HYPERLIPIDEMIA: ICD-10-CM

## 2021-11-10 DIAGNOSIS — M54.9 DISCOMFORT OF BACK: ICD-10-CM

## 2021-11-10 DIAGNOSIS — Z11.59 NEED FOR HEPATITIS C SCREENING TEST: ICD-10-CM

## 2021-11-10 DIAGNOSIS — Z00.00 WELL WOMAN EXAM (NO GYNECOLOGICAL EXAM): Primary | ICD-10-CM

## 2021-11-10 LAB
25(OH)D3 SERPL-MCNC: 28.7 NG/ML (ref 30–100)
ALBUMIN SERPL-MCNC: 3.6 G/DL (ref 3.4–5)
ALBUMIN/GLOB SERPL: 0.9 {RATIO} (ref 0.8–1.7)
ALP SERPL-CCNC: 61 U/L (ref 45–117)
ALT SERPL-CCNC: 35 U/L (ref 13–56)
ANION GAP SERPL CALC-SCNC: 4 MMOL/L (ref 3–18)
AST SERPL-CCNC: 34 U/L (ref 10–38)
BASOPHILS # BLD: 0.1 K/UL (ref 0–0.1)
BASOPHILS NFR BLD: 1 % (ref 0–2)
BILIRUB SERPL-MCNC: 0.4 MG/DL (ref 0.2–1)
BUN SERPL-MCNC: 13 MG/DL (ref 7–18)
BUN/CREAT SERPL: 16 (ref 12–20)
CALCIUM SERPL-MCNC: 8.6 MG/DL (ref 8.5–10.1)
CHLORIDE SERPL-SCNC: 105 MMOL/L (ref 100–111)
CHOLEST SERPL-MCNC: 211 MG/DL
CO2 SERPL-SCNC: 29 MMOL/L (ref 21–32)
CREAT SERPL-MCNC: 0.8 MG/DL (ref 0.6–1.3)
DIFFERENTIAL METHOD BLD: ABNORMAL
EOSINOPHIL # BLD: 0.1 K/UL (ref 0–0.4)
EOSINOPHIL NFR BLD: 2 % (ref 0–5)
ERYTHROCYTE [DISTWIDTH] IN BLOOD BY AUTOMATED COUNT: 20.3 % (ref 11.6–14.5)
EST. AVERAGE GLUCOSE BLD GHB EST-MCNC: 123 MG/DL
GLOBULIN SER CALC-MCNC: 3.8 G/DL (ref 2–4)
GLUCOSE SERPL-MCNC: 95 MG/DL (ref 74–99)
HBA1C MFR BLD: 5.9 % (ref 4.2–5.6)
HCT VFR BLD AUTO: 33.4 % (ref 35–45)
HCV AB SER IA-ACNC: <0.02 INDEX
HCV AB SERPL QL IA: NEGATIVE
HCV COMMENT,HCGAC: NORMAL
HDLC SERPL-MCNC: 61 MG/DL (ref 40–60)
HDLC SERPL: 3.5 {RATIO} (ref 0–5)
HGB BLD-MCNC: 10.1 G/DL (ref 12–16)
LDLC SERPL CALC-MCNC: 123.8 MG/DL (ref 0–100)
LIPID PROFILE,FLP: ABNORMAL
LYMPHOCYTES # BLD: 1.5 K/UL (ref 0.9–3.6)
LYMPHOCYTES NFR BLD: 26 % (ref 21–52)
MCH RBC QN AUTO: 21.4 PG (ref 24–34)
MCHC RBC AUTO-ENTMCNC: 30.2 G/DL (ref 31–37)
MCV RBC AUTO: 70.6 FL (ref 78–100)
MONOCYTES # BLD: 0.8 K/UL (ref 0.05–1.2)
MONOCYTES NFR BLD: 14 % (ref 3–10)
NEUTS SEG # BLD: 3.3 K/UL (ref 1.8–8)
NEUTS SEG NFR BLD: 57 % (ref 40–73)
PLATELET # BLD AUTO: 336 K/UL (ref 135–420)
PMV BLD AUTO: 9.3 FL (ref 9.2–11.8)
POTASSIUM SERPL-SCNC: 4.1 MMOL/L (ref 3.5–5.5)
PROT SERPL-MCNC: 7.4 G/DL (ref 6.4–8.2)
RBC # BLD AUTO: 4.73 M/UL (ref 4.2–5.3)
SODIUM SERPL-SCNC: 138 MMOL/L (ref 136–145)
TRIGL SERPL-MCNC: 131 MG/DL (ref ?–150)
TSH SERPL DL<=0.05 MIU/L-ACNC: 1.77 UIU/ML (ref 0.36–3.74)
VLDLC SERPL CALC-MCNC: 26.2 MG/DL
WBC # BLD AUTO: 5.9 K/UL (ref 4.6–13.2)

## 2021-11-10 PROCEDURE — 84443 ASSAY THYROID STIM HORMONE: CPT

## 2021-11-10 PROCEDURE — 80061 LIPID PANEL: CPT

## 2021-11-10 PROCEDURE — 82306 VITAMIN D 25 HYDROXY: CPT

## 2021-11-10 PROCEDURE — 86803 HEPATITIS C AB TEST: CPT

## 2021-11-10 PROCEDURE — 85025 COMPLETE CBC W/AUTO DIFF WBC: CPT

## 2021-11-10 PROCEDURE — 99396 PREV VISIT EST AGE 40-64: CPT | Performed by: FAMILY MEDICINE

## 2021-11-10 PROCEDURE — 80053 COMPREHEN METABOLIC PANEL: CPT

## 2021-11-10 PROCEDURE — 36415 COLL VENOUS BLD VENIPUNCTURE: CPT

## 2021-11-10 PROCEDURE — 83036 HEMOGLOBIN GLYCOSYLATED A1C: CPT

## 2021-11-10 PROCEDURE — 99214 OFFICE O/P EST MOD 30 MIN: CPT | Performed by: FAMILY MEDICINE

## 2021-11-10 NOTE — PATIENT INSTRUCTIONS
Well Visit, Ages 25 to 48: Care Instructions  Overview     Well visits can help you stay healthy. Your doctor has checked your overall health and may have suggested ways to take good care of yourself. Your doctor also may have recommended tests. At home, you can help prevent illness with healthy eating, regular exercise, and other steps. Follow-up care is a key part of your treatment and safety. Be sure to make and go to all appointments, and call your doctor if you are having problems. It's also a good idea to know your test results and keep a list of the medicines you take. How can you care for yourself at home? · Get screening tests that you and your doctor decide on. Screening helps find diseases before any symptoms appear. · Eat healthy foods. Choose fruits, vegetables, whole grains, protein, and low-fat dairy foods. Limit fat, especially saturated fat. Reduce salt in your diet. · Limit alcohol. If you are a man, have no more than 2 drinks a day or 14 drinks a week. If you are a woman, have no more than 1 drink a day or 7 drinks a week. · Get at least 30 minutes of physical activity on most days of the week. Walking is a good choice. You also may want to do other activities, such as running, swimming, cycling, or playing tennis or team sports. Discuss any changes in your exercise program with your doctor. · Reach and stay at a healthy weight. This will lower your risk for many problems, such as obesity, diabetes, heart disease, and high blood pressure. · Do not smoke or allow others to smoke around you. If you need help quitting, talk to your doctor about stop-smoking programs and medicines. These can increase your chances of quitting for good. · Care for your mental health. It is easy to get weighed down by worry and stress. Learn strategies to manage stress, like deep breathing and mindfulness, and stay connected with your family and community.  If you find you often feel sad or hopeless, talk with your doctor. Treatment can help. · Talk to your doctor about whether you have any risk factors for sexually transmitted infections (STIs). You can help prevent STIs if you wait to have sex with a new partner (or partners) until you've each been tested for STIs. It also helps if you use condoms (male or female condoms) and if you limit your sex partners to one person who only has sex with you. Vaccines are available for some STIs, such as HPV. · Use birth control if it's important to you to prevent pregnancy. Talk with your doctor about the choices available and what might be best for you. · If you think you may have a problem with alcohol or drug use, talk to your doctor. This includes prescription medicines (such as amphetamines and opioids) and illegal drugs (such as cocaine and methamphetamine). Your doctor can help you figure out what type of treatment is best for you. · Protect your skin from too much sun. When you're outdoors from 10 a.m. to 4 p.m., stay in the shade or cover up with clothing and a hat with a wide brim. Wear sunglasses that block UV rays. Even when it's cloudy, put broad-spectrum sunscreen (SPF 30 or higher) on any exposed skin. · See a dentist one or two times a year for checkups and to have your teeth cleaned. · Wear a seat belt in the car. When should you call for help? Watch closely for changes in your health, and be sure to contact your doctor if you have any problems or symptoms that concern you. Where can you learn more? Go to http://www.Pinevio.com/  Enter P072 in the search box to learn more about \"Well Visit, Ages 25 to 48: Care Instructions. \"  Current as of: February 11, 2021               Content Version: 13.0  © 9601-7486 Healthwise, Incorporated. Care instructions adapted under license by [employer] (which disclaims liability or warranty for this information).  If you have questions about a medical condition or this instruction, always ask your healthcare professional. [place] [address] any warranty or liability for your use of this information. A Healthy Lifestyle: Care Instructions  Your Care Instructions     A healthy lifestyle can help you feel good, stay at a healthy weight, and have plenty of energy for both work and play. A healthy lifestyle is something you can share with your whole family. A healthy lifestyle also can lower your risk for serious health problems, such as high blood pressure, heart disease, and diabetes. You can follow a few steps listed below to improve your health and the health of your family. Follow-up care is a key part of your treatment and safety. Be sure to make and go to all appointments, and call your doctor if you are having problems. It's also a good idea to know your test results and keep a list of the medicines you take. How can you care for yourself at home? · Do not eat too much sugar, fat, or fast foods. You can still have dessert and treats now and then. The goal is moderation. · Start small to improve your eating habits. Pay attention to portion sizes, drink less juice and soda pop, and eat more fruits and vegetables. ? Eat a healthy amount of food. A 3-ounce serving of meat, for example, is about the size of a deck of cards. Fill the rest of your plate with vegetables and whole grains. ? Limit the amount of soda and sports drinks you have every day. Drink more water when you are thirsty. ? Eat plenty of fruits and vegetables every day. Have an apple or some carrot sticks as an afternoon snack instead of a candy bar. Try to have fruits and/or vegetables at every meal.  · Make exercise part of your daily routine. You may want to start with simple activities, such as walking, bicycling, or slow swimming. Try to be active 30 to 60 minutes every day. You do not need to do all 30 to 60 minutes all at once.  For example, you can exercise 3 times a day for 10 or 20 minutes. Moderate exercise is safe for most people, but it is always a good idea to talk to your doctor before starting an exercise program.  · Keep moving. [place] the lawn, work in the garden, or [employer]. Take the stairs instead of the elevator at work. · If you smoke, quit. People who smoke have an increased risk for heart attack, stroke, cancer, and other lung illnesses. Quitting is hard, but there are ways to boost your chance of quitting tobacco for good. ? Use nicotine gum, patches, or lozenges. ? Ask your doctor about stop-smoking programs and medicines. ? Keep trying. In addition to reducing your risk of diseases in the future, you will notice some benefits soon after you stop using tobacco. If you have shortness of breath or asthma symptoms, they will likely get better within a few weeks after you quit. · Limit how much alcohol you drink. Moderate amounts of alcohol (up to 2 drinks a day for men, 1 drink a day for women) are okay. But drinking too much can lead to liver problems, high blood pressure, and other health problems. Family health  If you have a family, there are many things you can do together to improve your health. · Eat meals together as a family as often as possible. · Eat healthy foods. This includes fruits, vegetables, lean meats and dairy, and whole grains. · Include your family in your fitness plan. Most people think of activities such as jogging or tennis as the way to fitness, but there are many ways you and your family can be more active. Anything that makes you breathe hard and gets your heart pumping is exercise. Here are some tips:  ? Walk to do errands or to take your child to school or the bus.  ? Go for a family bike ride after dinner instead of watching TV. Where can you learn more? Go to http://www.gray.com/  Enter D949 in the search box to learn more about \"A Healthy Lifestyle: Care Instructions. \"  Current as of: June 16, 2021               Content Version: 13.0  © 5697-3003 [employer]. Care instructions adapted under license by [employer] (which disclaims liability or warranty for this information). If you have questions about a medical condition or this instruction, always ask your healthcare professional. [address] any warranty or liability for your use of this information. Learning About Low-Fat Eating  What is low-fat eating? Most food has some fat in it. Your body needs some fat to be healthy. But some kinds of fats are healthier than others. In a low-fat eating plan, you try to choose healthier fats and eat fewer unhealthy fats. Healthy fats include olive and canola oil. Try to avoid eating too much saturated fat, such as in cheese and meats. You do not need to cut all fat from your diet. But you can make healthier choices about the types and amount of fat you eat. Even though it is a good idea to choose healthier fats, it is still important to be careful of how much fat you eat, because all fats are high in calories. What are the different types of fats? Unhealthy fat  · Saturated fat. Saturated fats are mostly in animal foods, such as meat and dairy foods. Tropical oils, such as coconut oil, palm oil, and cocoa butter, are also saturated fats. Healthy fats  · Monounsaturated fat. Monounsaturated fats are liquid at room temperature but get solid when refrigerated. Eating foods that are high in this fat may help lower your \"bad\" (LDL) cholesterol, keep your \"good\" (HDL) cholesterol level up, and lower your chances of getting coronary artery disease. This fat is found in canola oil, olive oil, peanut oil, olives, avocados, nuts, and nut butters. · Polyunsaturated fat. Polyunsaturated fats are liquid at room temperature. They are in safflower, sunflower, and corn oils. They are also the main fat in seafood.  Omega-3 fatty acids are types of polyunsaturated fat. Eating fish may lower your chances of getting coronary artery disease. Fatty fish such as salmon and mackerel contain these healthy fatty acids. So do ground flaxseeds and flaxseed oil, soybeans, walnuts, and seeds. Why cut down on unhealthy fats? Eating foods that contain saturated fats can raise the LDL (\"bad\") cholesterol in your blood. Having a high level of LDL cholesterol increases your chance of hardening of the arteries (atherosclerosis), which can lead to heart disease, heart attack, and stroke. In general:  · No more than 10% of your daily calories should come from saturated fat. This is about 20 grams in a 2,000-calorie diet. · No more than 10% of your daily calories should come from polyunsaturated fat. This is about 20 grams in a 2,000-calorie diet. · Monounsaturated fats can be up to 15% of your daily calories. This is about 25 to 30 grams in a 2,000-calorie diet. If you're not sure how much fat you should be eating or how many calories you need each day to stay at a healthy weight, talk to a registered dietitian. A dietitian can help you create a plan that's right for you. What can you do to cut down on fat? Foods like cheese, butter, sausage, and desserts can have a lot of unhealthy fats. Try these tips for healthier meals at home and when you eat out. At home  · Fill up on fruits, vegetables, and whole grains. · Think of meat as a side dish instead of as the main part of your meal.  · When you do eat meat, make it extra-lean ground beef (97% lean), ground turkey breast (without skin added), meats with fat trimmed off before cooking, or skinless chicken. · Try main dishes that use whole wheat pasta, brown rice, dried beans, or vegetables. · Use cooking methods that use little or no fat, such as broiling, steaming, or grilling. Use cooking spray instead of oil. If you use oil, use a monounsaturated oil, such as canola or olive oil.   · Read food labels on canned, bottled, or packaged foods. Choose those with little saturated fat. When eating out at a restaurant  · Order foods that are broiled or poached instead of fried or breaded. · Cut back on the amount of butter or margarine that you use on bread. Use small amounts of olive oil instead. · Order sauces, gravies, and salad dressings on the side, and use only a little. · When you order pasta, choose tomato sauce instead of cream sauce. · Ask for salsa with your baked potato instead of sour cream, butter, cheese, or guzmán. Where can you learn more? Go to http://www.gray.com/  Enter [id number] in the search box to learn more about \"Learning About Low-Fat Eating. \"  Current as of: December 17, 2020               Content Version: 13.0  © 8506-9091 [employer]. Care instructions adapted under license by [employer] (which disclaims liability or warranty for this information). If you have questions about a medical condition or this instruction, always ask your healthcare professional. [place] [address] any warranty or liability for your use of this information. Well Visit, Ages 25 to 48: Care Instructions  Overview     Well visits can help you stay healthy. Your doctor has checked your overall health and may have suggested ways to take good care of yourself. Your doctor also may have recommended tests. At home, you can help prevent illness with healthy eating, regular exercise, and other steps. Follow-up care is a key part of your treatment and safety. Be sure to make and go to all appointments, and call your doctor if you are having problems. It's also a good idea to know your test results and keep a list of the medicines you take. How can you care for yourself at home? · Get screening tests that you and your doctor decide on. Screening helps find diseases before any symptoms appear. · Eat healthy foods.  Choose fruits, vegetables, whole grains, protein, and low-fat dairy foods. Limit fat, especially saturated fat. Reduce salt in your diet. · Limit alcohol. If you are a man, have no more than 2 drinks a day or 14 drinks a week. If you are a woman, have no more than 1 drink a day or 7 drinks a week. · Get at least 30 minutes of physical activity on most days of the week. Walking is a good choice. You also may want to do other activities, such as running, swimming, cycling, or playing tennis or team sports. Discuss any changes in your exercise program with your doctor. · Reach and stay at a healthy weight. This will lower your risk for many problems, such as obesity, diabetes, heart disease, and high blood pressure. · Do not smoke or allow others to smoke around you. If you need help quitting, talk to your doctor about stop-smoking programs and medicines. These can increase your chances of quitting for good. · Care for your mental health. It is easy to get weighed down by worry and stress. Learn strategies to manage stress, like deep breathing and mindfulness, and stay connected with your family and community. If you find you often feel sad or hopeless, talk with your doctor. Treatment can help. · Talk to your doctor about whether you have any risk factors for sexually transmitted infections (STIs). You can help prevent STIs if you wait to have sex with a new partner (or partners) until you've each been tested for STIs. It also helps if you use condoms (male or female condoms) and if you limit your sex partners to one person who only has sex with you. Vaccines are available for some STIs, such as HPV. · Use birth control if it's important to you to prevent pregnancy. Talk with your doctor about the choices available and what might be best for you. · If you think you may have a problem with alcohol or drug use, talk to your doctor. This includes prescription medicines (such as amphetamines and opioids) and illegal drugs (such as cocaine and methamphetamine).  Your doctor can help you figure out what type of treatment is best for you. · Protect your skin from too much sun. When you're outdoors from 10 a.m. to 4 p.m., stay in the shade or cover up with clothing and a hat with a wide brim. Wear sunglasses that block UV rays. Even when it's cloudy, put broad-spectrum sunscreen (SPF 30 or higher) on any exposed skin. · See a dentist one or two times a year for checkups and to have your teeth cleaned. · Wear a seat belt in the car. When should you call for help? Watch closely for changes in your health, and be sure to contact your doctor if you have any problems or symptoms that concern you. Where can you learn more? Go to http://www.winchester.com/  Enter P072 in the search box to learn more about \"Well Visit, Ages 25 to 48: Care Instructions. \"  Current as of: February 11, 2021               Content Version: 13.0  © 2006-2021 [employer]. Care instructions adapted under license by [employer] (which disclaims liability or warranty for this information). If you have questions about a medical condition or this instruction, always ask your healthcare professional. [place] [address] any warranty or liability for your use of this information. Breast Self-Exam: Care Instructions  Your Care Instructions     A breast self-exam is when you check your breasts for lumps or changes. This regular exam helps you learn how your breasts normally look and feel. Most breast problems or changes are not because of cancer. Breast self-exam is not a substitute for a mammogram. Having regular breast exams by your doctor and regular mammograms improve your chances of finding any problems with your breasts. Some women set a time each month to do a step-by-step breast self-exam. Other women like a less formal system. They might look at their breasts as they brush their teeth, or feel their breasts once in a while in the shower.   If you notice a change in your breast, tell your doctor. Follow-up care is a key part of your treatment and safety. Be sure to make and go to all appointments, and call your doctor if you are having problems. It's also a good idea to know your test results and keep a list of the medicines you take. How do you do a breast self-exam?  · The best time to examine your breasts is usually one week after your menstrual period begins. Your breasts should not be tender then. If you do not have periods, you might do your exam on a day of the month that is easy to remember. · To examine your breasts:  ? Remove all your clothes above the waist and lie down. When you are lying down, your breast tissue spreads evenly over your chest wall, which makes it easier to feel all your breast tissue. ? Use the pads--not the fingertips--of the 3 middle fingers of your left hand to check your right breast. Move your fingers slowly in small coin-sized circles that overlap. ? Use three levels of pressure to feel of all your breast tissue. Use light pressure to feel the tissue close to the skin surface. Use medium pressure to feel a little deeper. Use firm pressure to feel your tissue close to your breastbone and ribs. Use each pressure level to feel your breast tissue before moving on to the next spot. ? Check your entire breast, moving up and down as if following a strip from the collarbone to the bra line, and from the armpit to the ribs. Repeat until you have covered the entire breast.  ? Repeat this procedure for your left breast, using the pads of the 3 middle fingers of your right hand. · To examine your breasts while in the shower:  ? Place one arm over your head and lightly soap your breast on that side. ? Using the pads of your fingers, gently move your hand over your breast (in the strip pattern described above), feeling carefully for any lumps or changes.   ? Repeat for the other breast.  · Have your doctor inspect anything you notice to see if you need further testing. Where can you learn more? Go to http://www.gray.com/  Enter P148 in the search box to learn more about \"Breast Self-Exam: Care Instructions. \"  Current as of: December 17, 2020               Content Version: 13.0  © 6416-3456 Healthwise, Incorporated. Care instructions adapted under license by [employer] (which disclaims liability or warranty for this information). If you have questions about a medical condition or this instruction, always ask your healthcare professional. [address] any warranty or liability for your use of this information.

## 2021-11-10 NOTE — PROGRESS NOTES
Subjective:   42 y.o. female for Well Woman Check. Her gyne and breast care is done elsewhere by her Ob-Gyne physician. [facility] ob/gyn  Last Pap smear was last year. No abnormal Pap smear. No pelvic pain no vaginal discharge. No history of STD. Does on and off self breast exam.  Had done mammogram last year as well. Not sure what month. We will obtain the records from [address] [place] [address]. No family history of breast cancer, cervical cancer, ovarian cancer or colon cancer. Does follow dermatology for yearly skin check. No new lesion or any skin changes  No labs done since long time. I do not have any records since 2016  Noted low WBC count. We will repeat the labs. Also feeling chronic fatigue on and off. No regular menstrual cycle. History of vitamin D deficiency as well. No known thyroid problem. No recent weight or appetite change. Concern with the lower back discomfort. No fall or injury. Asking for physical therapy to improve the core muscle strength as it was helping in the past.  No loss of urine or bowel control. No recent fall or injury. No radiation of pain. Pain is mild to moderate discomfort on and off. Not taking any medication for current problem. Patient Active Problem List    Diagnosis Date Noted    Diastasis recti 07/29/2020    H/O sickle cell trait 07/29/2020    Indigestion 07/29/2020    Prediabetes 06/15/2015    H/O abnormal mammogram/ repeat in aug 2015 05/13/2015    H/O hiatal hernia 05/13/2015    Oral contraceptive use 05/13/2015    Other constipation 05/13/2015     Current Outpatient Medications   Medication Sig Dispense Refill    cholecalciferol, vitamin D3, (VITAMIN D3 PO) Take  by mouth.  naproxen sodium (ALEVE PO) Take  by mouth.  OTHER C Multivitamin and Hemp Seed      pantoprazole (PROTONIX) 20 mg tablet Take 1 Tab by mouth daily. 30 Tab 1    BIOTIN PO Take  by mouth.  Biotin with iron       Allergies   Allergen Reactions    Latex Hives    Adhesive Other (comments)     Per patient adhesive left scar    Oxycodone-Acetaminophen Itching     No past medical history on file. Past Surgical History:   Procedure Laterality Date    HX  SECTION       Family History   Problem Relation Age of Onset   Rocha Drug Abuse Father     Hypertension Mother     Diabetes Maternal Grandmother     Diabetes Paternal Grandmother      Social History     Tobacco Use    Smoking status: Never Smoker    Smokeless tobacco: Never Used   Substance Use Topics    Alcohol use: Yes     Comment: 2-3 drinks per day        ROS: Feeling generally well. No TIA's or unusual headaches, no dysphagia. No prolonged cough. No dyspnea or chest pain on exertion. No abdominal pain, change in bowel habits, black or bloody stools. No urinary tract symptoms. No new or unusual musculoskeletal symptoms. Specific concerns today: See HPI    Objective: The patient appears well, alert, oriented x 3, in no distress. Visit Vitals  /80 (BP 1 Location: Left upper arm, BP Patient Position: Sitting, BP Cuff Size: Adult)   Pulse 76   Temp 96.9 °F (36.1 °C) (Temporal)   Resp 16   Ht 5' 3.75\" (1.619 m)   Wt 173 lb 6.4 oz (78.7 kg)   LMP 2021   SpO2 98%   BMI 30.00 kg/m²     ENT normal.  Neck supple. No adenopathy or thyromegaly. NATALIA. Lungs are clear, good air entry, no wheezes, rhonchi or rales. S1 and S2 normal, no murmurs, regular rate and rhythm. Abdomen soft without tenderness, guarding, mass or organomegaly. Extremities show no edema, normal peripheral pulses. Neurological is normal, no focal findings. Breast and Pelvic exams are deferred. Back: No point tenderness over lumbosacral spine  Assessment/Plan:       ICD-10-CM ICD-9-CM    1. Well woman exam (no gynecological exam)  Z00.00 V70.0     [V70.0]   2. Leukopenia, unspecified type: No records of labs done since 2016 in the chart but patient did have labs done at dermatology.   We will repeat the labs D72.828 288.50 CBC WITH AUTOMATED DIFF   3. Dyslipidemia: Lifestyle modification discussed along with diet modification. We will recheck labs E78.5 272.4 LIPID PANEL      METABOLIC PANEL, COMPREHENSIVE   4. Vitamin D deficiency: We will recheck labs. Not on supplement E55.9 268.9 VITAMIN D, 25 HYDROXY   5. Screening for diabetes mellitus  Z13.1 V77.1 HEMOGLOBIN A1C WITH EAG   6. Screening for hyperlipidemia  Z13.220 V77.91    7. Need for hepatitis C screening test  Z11.59 V73.89 HEPATITIS C AB   8. Other fatigue: Generalized fatigue. Will obtain labs. Advised healthy lifestyle R53.83 780.79 VITAMIN D, 25 HYDROXY      TSH 3RD GENERATION      CBC WITH AUTOMATED DIFF      METABOLIC PANEL, COMPREHENSIVE      REFERRAL TO NUTRITION   9. BMI 30.0-30.9,adult: Discussed importance of weight loss and healthy lifestyle. She is working on diet modification Z68.30 V85.30 REFERRAL TO NUTRITION   10. Discomfort of back: On and off. Low back discomfort. In the past core strengthening exercise help. We will send for physical therapy M54.9 534.5 REFERRAL TO PHYSICAL THERAPY   Pt understood and agree with the plan     Review hM   She will first take the Covid booster then advised to wait 2 weeks And then she can get flu shot and Tdap  Mammogram and Pap smear records from [address] [place] [address]. Patient will call their office and also nurse to advised to get the record release signed  Follow-up and Dispositions    · Return in about 6 months (around 5/10/2022). Please note that this dictation was completed with [employer], the computer voice recognition software. Quite often unanticipated grammatical, syntax, homophones, and other interpretive errors are inadvertently transcribed by the computer software. Please disregard these errors. Please excuse any errors that have escaped final proofreading.

## 2021-11-10 NOTE — PROGRESS NOTES
1. \"Have you been to the ER, urgent care clinic since your last visit? Hospitalized since your last visit? \"No    2. \"Have you seen or consulted any other health care providers outside of the [address] [place] [address] since your last visit? \" No     3. For patients aged 39-70: Has the patient had a colonoscopy? Yes - \"not recently a long time ago\" - over 5 years    If the patient is female:    4. For patients aged 41-77: Has the patient had a mammogram within the past 2 years? 2020 - [facility]    5. For patients aged 21-65: Has the patient had a pap smear? Yes, but HM not satisfied. Rooming MA/LPN to request most recent results 11/2020 Dr. Reatha Cordoba Maximiano Castleman)    Chief Complaint   Patient presents with    Well Woman     fatigue    Medication Evaluation     vitamin d - wants Rx    Referral Request     PT       Patient declined flu vaccine.

## 2021-11-12 DIAGNOSIS — E61.1 IRON DEFICIENCY: Primary | ICD-10-CM

## 2021-11-12 PROBLEM — E55.9 VITAMIN D DEFICIENCY: Status: ACTIVE | Noted: 2021-11-12

## 2021-11-12 PROBLEM — R73.01 IMPAIRED FASTING BLOOD SUGAR: Status: ACTIVE | Noted: 2021-11-12

## 2021-11-12 RX ORDER — FERROUS SULFATE 325(65) MG
325 TABLET, DELAYED RELEASE (ENTERIC COATED) ORAL
Qty: 90 TABLET | Refills: 1 | Status: SHIPPED | OUTPATIENT
Start: 2021-11-12

## 2021-11-12 NOTE — PROGRESS NOTES
Low vitamin D. Take otc 2000 units daily. A1C in prediabetic range. Diet modification. Mild elevated LDL. Again diet modification. Low H & H. Sending to hematology. Also starting iron supplement. Will recheck labs in a month.

## 2021-11-17 ENCOUNTER — HOSPITAL ENCOUNTER (OUTPATIENT)
Dept: PHYSICAL THERAPY | Age: 41
Discharge: HOME | End: 2021-11-17
Attending: FAMILY MEDICINE
Payer: COMMERCIAL

## 2021-11-17 PROCEDURE — 97110 THERAPEUTIC EXERCISES: CPT

## 2021-11-17 PROCEDURE — 97162 PT EVAL MOD COMPLEX 30 MIN: CPT

## 2021-11-17 NOTE — PROGRESS NOTES
[facility]  [address]  [place], [address]  [phone] fax    Plan of Care/ Statement of Necessity for Physical Therapy Services    Patient name: Juan Rowland Start of Care: 2021   Referral source: Smiley Sher MD : 1980    Medical Diagnosis: Other low back pain [M54.59]  Payor: BLUE CROSS / Plan: Jason Navarrete [id number] PPO / Product Type: PPO /  Onset Date:chronic, increased past year    Treatment Diagnosis: LBP   Prior Hospitalization: see medical history Provider#: [id number]   Medications: Verified on Patient summary List    Comorbidities: latex allergy, LBP,    Prior Level of Function: functionally I with all activities     The Plan of Care and following information is based on the information from the initial evaluation. Assessment/ key information: 41 y/o female presents with long h/o LBP, which has worsened over the past year and is now affecting her daily activities. She reports she thinks this is due to limited core from pregnancy and . The pt demonstrates increased lumbar lordosis posture. Lumbar AROM is WFL with exception of flexion. The pt demonstrates limited strength of B glute med and max, limited abdominal strength, and limited core stability. Limited flexibility noted of B hip flexors and hamstrings. No tenderness to palpation is noted of the lumbar paraspinals, QL and iliopsoas. The pt will benefit from PT to address the aforementioned impairments. Evaluation Complexity History MEDIUM  Complexity : 1-2 comorbidities / personal factors will impact the outcome/ POC ; Examination MEDIUM Complexity : 3 Standardized tests and measures addressing body structure, function, activity limitation and / or participation in recreation  ;Presentation MEDIUM Complexity : Evolving with changing characteristics  ; Clinical Decision Making MEDIUM Complexity : FOTO score of 26-74  Overall Complexity Rating: MEDIUM  Problem List: pain affecting function, decrease ROM, decrease strength, decrease ADL/ functional abilitiies, decrease activity tolerance and decrease flexibility/ joint mobility   Treatment Plan may include any combination of the following: Therapeutic exercise, Therapeutic activities, Neuromuscular re-education, Physical agent/modality, Manual therapy, Patient education and Self Care training  Patient / Family readiness to learn indicated by: asking questions, trying to perform skills and interest  Persons(s) to be included in education: patient (P)  Barriers to Learning/Limitations: None  Patient Goal (s): To strengthen core and decrease the pain  Patient Self Reported Health Status: good  Rehabilitation Potential: good    Short Term Goals: To be accomplished in 1 weeks:   1. The pt will be I and compliant with HEP     Long Term Goals: To be accomplished in 4 weeks:   1. Improve FOTO score to predicted outcome for improved ability for daily tasks   2. The pt will demonstrate good plank test for 30 sec to improve ability for functional tasks   3. The pt will report no limitation with walking several blocks   4. The pt will report at least 50% improvement for improved ability for daily activities. Frequency / Duration: Patient to be seen 1-2 times per week for 4 weeks. Patient/ Caregiver education and instruction: Diagnosis, prognosis, self care, activity modification and exercises   [x]  Plan of care has been reviewed with AMADEO Wolfe, PT 11/17/2021 1:37 PM    ________________________________________________________________________    I certify that the above Therapy Services are being furnished while the patient is under my care. I agree with the treatment plan and certify that this therapy is necessary.     Physician's Signature:____________Date:_________TIME:________     Bartolo Franco MD  ** Signature, Date and Time must be completed for valid certification **    Please sign and return to [address]  [address]  [place], [address]  [phone] fax

## 2021-11-17 NOTE — PROGRESS NOTES
PT DAILY TREATMENT NOTE     Patient Name: Chitra Walker  Date:2021  : 1980  [x]  Patient  Verified  Payor: Anahy Camarillo / Plan: Jason Navarrete [id number] PPO / Product Type: PPO /    In time:1145  Out time:1215  Total Treatment Time (min): 30  Visit #: 1 of 4-8    Medicare/BCBS Only   Total Timed Codes (min):  15 1:1 Treatment Time:  30       Treatment Area:  Other low back pain [M54.59]    SUBJECTIVE   Pain Level (0-10 scale): 4  Any medication changes, allergies to medications, adverse drug reactions, diagnosis change, or new procedure performed?: [x] No    [] Yes (see summary sheet for update)  Subjective functional status/changes:   [] No changes reported       OBJECTIVE    Modality rationale:    Min Type Additional Details    [] Estim:  []Unatt       []IFC  []Premod                        []Other:  []w/ice   []w/heat  Position:  Location:    [] Estim: []Att    []TENS instruct  []NMES                    []Other:  []w/US   []w/ice   []w/heat  Position:  Location:    []  Traction: [] Cervical       []Lumbar                       [] Prone          []Supine                       []Intermittent   []Continuous Lbs:  [] before manual  [] after manual    []  Ultrasound: []Continuous   [] Pulsed                           []1MHz   []3MHz W/cm2:  Location:    []  Iontophoresis with dexamethasone         Location: [] Take home patch   [] In clinic    []  Ice     []  heat  []  Ice massage  []  Laser   []  Anodyne Position:  Location:    []  Laser with stim  []  Other:  Position:  Location:    []  Vasopneumatic Device    []  Right     []  Left  Pre-treatment girth:  Post-treatment girth:  Measured at (location):  Pressure:       [] lo [] med [] hi   Temperature: [] lo [] med [] hi   [] Skin assessment post-treatment:  []intact []redness- no adverse reaction    []redness  adverse reaction:     15 min [x]Eval                  []Re-Eval       15 min Therapeutic Exercise:  [] See flow sheet : HEP   Rationale: increase ROM and increase strength to improve the patients ability to perform ADL            With   [] TE   [] TA   [] neuro   [] other: Patient Education: [x] Review HEP    [] Progressed/Changed HEP based on:   [] positioning   [] body mechanics   [] transfers   [] heat/ice application    [] other:      Other Objective/Functional Measures:       Pain Level (0-10 scale) post treatment: 4    ASSESSMENT/Changes in Function:      Patient will continue to benefit from skilled PT services to modify and progress therapeutic interventions, address functional mobility deficits, address ROM deficits, address strength deficits, analyze and address soft tissue restrictions, analyze and cue movement patterns, analyze and modify body mechanics/ergonomics and assess and modify postural abnormalities to attain remaining goals. [x]  See Plan of Care  []  See progress note/recertification  []  See Discharge Summary         Progress towards goals / Updated goals:  Short Term Goals: To be accomplished in 1 weeks:   1. The pt will be I and compliant with HEP   IE- issued HEP     Long Term Goals: To be accomplished in 4 weeks:   1. Improve FOTO score to predicted outcome for improved ability for daily tasks   IE- 54   2. The pt will demonstrate good plank test for 30 sec to improve ability for functional tasks   IE- 15 sec prior to increase in lordosis   3. The pt will report no limitation with walking several blocks   IE- limited a lot   4. The pt will report at least 50% improvement for improved ability for daily activities.     IE- gradual worsening    PLAN  []  Upgrade activities as tolerated     [x]  Continue plan of care  []  Update interventions per flow sheet       []  Discharge due to:_  []  Other:_      Vicente Klein, PT 11/17/2021  1:30 PM    Future Appointments   Date Time Provider Gardenia Hernandez   12/1/2021  9:30 AM Anila Frank RD [facility]   12/6/2021  8:15 AM Marianela Christian, PT [facility]   12/13/2021 9:00 AM Breanna Najera, PT [facility]   12/20/2021  9:00 AM Jian Najera, PT [facility]

## 2021-12-01 ENCOUNTER — HOSPITAL ENCOUNTER (OUTPATIENT)
Dept: NUTRITION | Age: 41
End: 2021-12-01
Attending: FAMILY MEDICINE

## 2021-12-06 ENCOUNTER — HOSPITAL ENCOUNTER (OUTPATIENT)
Dept: PHYSICAL THERAPY | Age: 41
Discharge: HOME | End: 2021-12-06
Attending: FAMILY MEDICINE
Payer: COMMERCIAL

## 2021-12-06 PROCEDURE — 97110 THERAPEUTIC EXERCISES: CPT

## 2021-12-06 PROCEDURE — 97140 MANUAL THERAPY 1/> REGIONS: CPT

## 2021-12-06 PROCEDURE — 97112 NEUROMUSCULAR REEDUCATION: CPT

## 2021-12-06 NOTE — PROGRESS NOTES
PT DAILY TREATMENT NOTE     Patient Name: Isra Barkley  Date:2021  : 1980  [x]  Patient  Verified  Payor: Alberto Alyse / Plan: Jason Navarrete [id number] PPO / Product Type: PPO /    In time:845  Out time:900  Total Treatment Time (min): 45  Visit #: 2 of 8    Medicare/BCBS Only   Total Timed Codes (min):  45 1:1 Treatment Time:  45       Treatment Area: Other low back pain [M54.59]    SUBJECTIVE  Pain Level (0-10 scale): 3/10  Any medication changes, allergies to medications, adverse drug reactions, diagnosis change, or new procedure performed?: [x] No    [] Yes (see summary sheet for update)  Subjective functional status/changes:   [] No changes reported  \" Last child birth  11 years ago, pain ever since\"  Patient also c/o of abdominal spasm and dull LBP with functional activities such as cleaning. OBJECTIVE      12 min Therapeutic Exercise:  [x] See flow sheet : Reviewed and issued updated HEP. Lumbar stretching to release paraspinals. Rationale: increase ROM to improve the patients ability to perform ADL's with increased ease. 25 min Neuromuscular Re-education:  [x]  See flow sheet :Postural training, body mechanics, postioning, Breath work, [employer] training. Rationale: increase strength, improve coordination and increase proprioception  to improve the patients ability to learn to contract TVA to support spine. 8 min Manual Therapy:  STM/MFR to Left rectus Abdominis   The manual therapy interventions were performed at a separate and distinct time from the therapeutic activities interventions. Rationale: decrease pain, decrease trigger points and increase postural awareness to improve tolerance to home chores with decreased spasms.              With   [x] TE   [] TA   [x] neuro   [] other: Patient Education: [x] Review HEP    [] Progressed/Changed HEP based on:   [] positioning   [] body mechanics   [] transfers   [] heat/ice application    [] other:      Other Objective/Functional Measures: TVA 1/5     Pain Level (0-10 scale) post treatment: 2/10    ASSESSMENT/Changes in Function: Patient has 1 finger sepreration at upper Rectus and 2 fingers near umbilicus. Overuse of rectus to stabilize causing abdominal spasms. TVA 1/5     Patient will continue to benefit from skilled PT services to modify and progress therapeutic interventions, address functional mobility deficits, address ROM deficits, address strength deficits, analyze and address soft tissue restrictions, analyze and cue movement patterns, analyze and modify body mechanics/ergonomics and assess and modify postural abnormalities to attain remaining goals. [x]  See Plan of Care  []  See progress note/recertification  []  See Discharge Summary         Progress towards goals / Updated goals:  Short Term Goals: To be accomplished in 1 weeks:              1. The pt will be I and compliant with HEP              IE- issued HEP   Current: MET 12/06/21                Long Term Goals: To be accomplished in 4 weeks:              1. Improve FOTO score to predicted outcome for improved ability for daily tasks              IE- 54              2. The pt will demonstrate good plank test for 30 sec to improve ability for functional tasks              IE- 15 sec prior to increase in lordosis              3. The pt will report no limitation with walking several blocks              IE- limited a lot              4. The pt will report at least 50% improvement for improved ability for daily activities.                IE- gradual worsening    PLAN  []  Upgrade activities as tolerated     [x]  Continue plan of care  []  Update interventions per flow sheet       []  Discharge due to:_  []  Other:_      Glory Goss, PT 12/6/2021  8:11 AM    Future Appointments   Date Time Provider Gardenia Hernandez   12/6/2021  8:15 [facility]   12/13/2021  9:00 AM Breanna Najera, PT [facility]   12/20/2021  9:00 AM Reinaldo Noemi [facility]   12/28/2021  9:30 AM [facility]

## 2021-12-08 ENCOUNTER — TELEPHONE (OUTPATIENT)
Dept: FAMILY MEDICINE CLINIC | Age: 41
End: 2021-12-08

## 2021-12-08 NOTE — TELEPHONE ENCOUNTER
----- Message from Ramone Shah sent at 12/7/2021  2:07 PM EST -----  Subject: Results Request    QUESTIONS  Which lab or imaging result is the patient calling about? bloodwork and   mammogram results   Which provider ordered the test?   At what location was the test performed? Date the test was performed? 2021-11-30  Additional Information for Provider? she had some blood work done and   havent received a call for results. also would like results for mammogram   she just got done.   ---------------------------------------------------------------------------  --------------  CALL BACK INFO  What is the best way for the office to contact you? OK to leave message on   voicemail  Preferred Call Back Phone Number?  [phone]

## 2021-12-08 NOTE — TELEPHONE ENCOUNTER
Contacted patient and verified identity using name and date of birth (2- identifiers)  Spoke with patient and  Low vitamin D. Take otc 2000 units daily. A1C in prediabetic range. Diet modification. Mild elevated LDL. Again diet modification. Low H & H. Sending to hematology. Also starting iron supplement. Will recheck labs in a month. Mammogram ordered by OB/GYN. Patient aware to obtain results from their office.

## 2021-12-08 NOTE — PROGRESS NOTES
Contacted patient and verified identity using name and date of birth (2- identifiers)  Spoke with patient and she verbalized understanding of Low vitamin D. Take otc 2000 units daily. A1C in prediabetic range. Diet modification. Mild elevated LDL. Again diet modification. Low H & H. Sending to hematology. Also starting iron supplement.  Will recheck labs in a month

## 2021-12-13 ENCOUNTER — HOSPITAL ENCOUNTER (OUTPATIENT)
Dept: PHYSICAL THERAPY | Age: 41
Discharge: HOME | End: 2021-12-13
Attending: FAMILY MEDICINE
Payer: COMMERCIAL

## 2021-12-13 PROCEDURE — 97112 NEUROMUSCULAR REEDUCATION: CPT

## 2021-12-13 PROCEDURE — 97110 THERAPEUTIC EXERCISES: CPT

## 2021-12-13 PROCEDURE — 97140 MANUAL THERAPY 1/> REGIONS: CPT

## 2021-12-13 NOTE — PROGRESS NOTES
PT DAILY TREATMENT NOTE     Patient Name: Taylor Ponce  Date:2021  : 1980  [x]  Patient  Verified  Payor: BLUE CROSS / Plan: Jason Navarrete [id number] PPO / Product Type: PPO /    In time:945  Out time:1045  Total Treatment Time (min): 60  Visit #: 3 of 8      Treatment Area: Other low back pain [M54.59]    SUBJECTIVE  Pain Level (0-10 scale): 0/10  Any medication changes, allergies to medications, adverse drug reactions, diagnosis change, or new procedure performed?: [x] No    [] Yes (see summary sheet for update)  Subjective functional status/changes:   [] No changes reported  \" Not as much pain today as last week, also didn't notice Spams since last visit\"    OBJECTIVE    17 min Therapeutic Exercise:  [x] See flow sheet : Added stretching over foam roll psoas and upper thoracic extension. Rationale: increase ROM and increase strength to improve the patients ability to perform ADL's with increased ease. 35 min Neuromuscular Re-education:  [x]  See flow sheet : Added Reformer stabilization. Rationale: increase strength and increase proprioception  to improve the patients ability to stabilize spine and prevent spasms. 8 min Manual Therapy:  STM/MFR to bilateral Rectus  Abdominis, floating ribs. The manual therapy interventions were performed at a separate and distinct time from the therapeutic activities interventions. Rationale: decrease pain and decrease trigger points to prevent muscle spasms with home chores. With   [x] TE   [] TA   [x] neuro   [] other: Patient Education: [x] Review HEP    [] Progressed/Changed HEP based on:   [] positioning   [] body mechanics   [] transfers   [] heat/ice application    [] other:      Other Objective/Functional Measures: TVA 1/5     Pain Level (0-10 scale) post treatment: 0/10    ASSESSMENT/Changes in Function: Patient challenged with upper ab strength based exercise, over recruitment of rectus causing spasm.  Benefiting from cuing movement patterns for improved TVA recruitment. Patient will continue to benefit from skilled PT services to modify and progress therapeutic interventions, address functional mobility deficits, address ROM deficits, address strength deficits, analyze and address soft tissue restrictions, analyze and cue movement patterns, analyze and modify body mechanics/ergonomics and assess and modify postural abnormalities to attain remaining goals. [x]  See Plan of Care  []  See progress note/recertification  []  See Discharge Summary         Progress towards goals / Updated goals:  Short Term Goals: To be accomplished in 1 weeks:              4. The pt will be I and compliant with HEP              IE- issued HEP              Current: MET 12/06/21                Long Term Goals: To be accomplished in 4 weeks:              1. Improve FOTO score to predicted outcome for improved ability for daily tasks              IE- 54              2. The pt will demonstrate good plank test for 30 sec to improve ability for functional tasks              IE- 15 sec prior to increase in lordosis   Current: Abdominal bulging with plank 12/13/21              3. The pt will report no limitation with walking several blocks              IE- limited a lot              4. The pt will report at least 50% improvement for improved ability for daily activities.                AP- gradual worsening    PLAN  []  Upgrade activities as tolerated     [x]  Continue plan of care  []  Update interventions per flow sheet       []  Discharge due to:_  []  Other:_      Emeka Zacarias, PT 12/13/2021  9:40 AM    Future Appointments   Date Time Provider Gardenia Hernandez   12/13/2021  9:45 AM [facility]   12/20/2021  9:00 [facility]   12/28/2021  9:30 AM Rajendra Esposito [facility]

## 2021-12-20 ENCOUNTER — HOSPITAL ENCOUNTER (OUTPATIENT)
Dept: PHYSICAL THERAPY | Age: 41
Discharge: HOME | End: 2021-12-20
Attending: FAMILY MEDICINE
Payer: COMMERCIAL

## 2021-12-20 PROCEDURE — 97112 NEUROMUSCULAR REEDUCATION: CPT

## 2021-12-20 PROCEDURE — 97140 MANUAL THERAPY 1/> REGIONS: CPT

## 2021-12-20 PROCEDURE — 97110 THERAPEUTIC EXERCISES: CPT

## 2021-12-20 NOTE — PROGRESS NOTES
[facility]  [address] [facility] [address]  [place], [address]  [phone] fax    Physical Therapy Progress Note  Patient name: Johanan Hyde Start of Care: 21   Referral source: Jayjay Appiah MD : 1980   Medical/Treatment Diagnosis: Other low back pain [M54.59]  Payor: BLUE CROSS / Plan: Jason Navarrete [id number] PPO / Product Type: PPO /  Onset Date:Chronic, increased past year     Prior Hospitalization: see medical history Provider#: [id number]   Medications: Verified on Patient Summary List    Comorbidities: Latex Allergy, LBP, Csection  Prior Level of Function:Functionally I with all activites. Visits from Start of Care: 3    Missed Visits: 0      Established Goals:        Short Term Goals: To be accomplished in 1 weeks:              0. The pt will be I and compliant with HEP              IE- issued HEP              Current: MET 21                Long Term Goals: To be accomplished in 4 weeks:              1. Improve FOTO score to predicted outcome for improved ability for daily tasks              IE- 54   Current: 58              2. The pt will demonstrate good plank test for 30 sec to improve ability for functional tasks              IE- 15 sec prior to increase in lordosis   Current: Progressing Alternate knees and feet to maintain TA contraction.               3. The pt will report no limitation with walking several blocks              IE- limited a lot   Current: Pain after walking in [place] shopping.               4. The pt will report at least 50% improvement for improved ability for daily activities.             VK- gradual worsening   Current: 50 % improvement    Key Functional Changes: Improved connection to TA mms. Decreased Rectus Abd mm spasms. Improving function. Needs TA strengthening. Updated Goals: to be achieved in 4-6 weeks:              1.  Improve FOTO score to predicted outcome for improved ability for daily tasks   PN: 58              2. The pt will demonstrate good plank test for 30 sec to improve ability for functional tasks   PN: Progressing Alternate knees and feet to maintain TA contraction 5 sec intervals for 15 sec.               3. The pt will report no limitation with walking several blocks   PN: Pain after walking in [place] shopping.              4. The pt will report at least 75% improvement for improved ability for daily activities, and return to recreation. PN: 50 % improvement   5. Increased TA strength to 3/5 for improved spinal stabilization with functional movement. PN:  TA 1/5 draw in only    ASSESSMENT/RECOMMENDATIONS: Patient progressing well, Improved awareness of pelvic position and TA contraction as well as decreased abdominal spasms. [x]Continue therapy per initial plan/protocol at a frequency of  2 x per week for 4-6 weeks  []Continue therapy with the following recommended changes:_____________________      _____________________________________________________________________  []Discontinue therapy progressing towards or have reached established goals  []Discontinue therapy due to lack of appreciable progress towards goals  []Discontinue therapy due to lack of attendance or compliance  []Await Physician's recommendations/decisions regarding therapy  []Other:________________________________________________________________    Thank you for this referral.    Heidi Raygoza, PT 12/20/2021 9:06 AM  NOTE TO PHYSICIAN:  PLEASE COMPLETE THE ORDERS BELOW AND   FAX TO [facility]: [phone]  If you are unable to process this request in 24 hours please contact our office: [phone]    ? I have read the above report and request that my patient continue as recommended. ? I have read the above report and request that my patient continue therapy with the following changes/special instructions:__________________________________________________________  ?  I have read the above report and request that my patient be discharged from therapy.     Physicians signature: ______________________________Date: ______Time:______     Adrián Aguilar MD

## 2021-12-20 NOTE — PROGRESS NOTES
PT DAILY TREATMENT NOTE     Patient Name: Catarina Mars  Date:2021  : 1980  [x]  Patient  Verified  Payor: Hansel Rasmussen / Plan: Jason Navarrete [id number] PPO / Product Type: PPO /    In time:900  Out time:945  Total Treatment Time (min): 45  Visit #: 1 of     Medicare/[facility] Only   Total Timed Codes (min):  45 1:1 Treatment Time:  45       Treatment Area: Other low back pain [M54.59]    SUBJECTIVE  Pain Level (0-10 scale): 0/10  Any medication changes, allergies to medications, adverse drug reactions, diagnosis change, or new procedure performed?: [x] No    [] Yes (see summary sheet for update)  Subjective functional status/changes:   [] No changes reported  \"Able to walk further, but back still gets sore. \"    OBJECTIVE      10 min Therapeutic Exercise:  [x] See flow sheet :   Rationale: increase ROM and increase strength to improve the patients ability to perform ADL's with ease. 27 min Neuromuscular Re-education:  [x]  See flow sheet :   Rationale: increase strength and increase proprioception  to improve the patients ability to stabilize spine, prevent Abd spasms. 8 min Manual Therapy: STM/MFR to bilateral Rectus  Abdominis, floating ribs. The manual therapy interventions were performed at a separate and distinct time from the therapeutic activities interventions. Rationale: decrease pain, increase ROM and increase tissue extensibility to improve function without spasm. With   [] TE   [] TA   [] neuro   [] other: Patient Education: [x] Review HEP    [] Progressed/Changed HEP based on:   [] positioning   [] body mechanics   [] transfers   [] heat/ice application    [] other:      Other Objective/Functional Measures: See PN     Pain Level (0-10 scale) post treatment: 0/10    ASSESSMENT/Changes in Function: Good tolerance to Roll backs and abd work over small ball with decreased intensity.      Patient will continue to benefit from skilled PT services to modify and progress therapeutic interventions, address functional mobility deficits, address strength deficits, analyze and address soft tissue restrictions, analyze and cue movement patterns, analyze and modify body mechanics/ergonomics and assess and modify postural abnormalities to attain remaining goals. [x]  See Plan of Care  []  See progress note/recertification  []  See Discharge Summary         Progress towards goals / Updated goals:  Updated Goals: to be achieved in 4-6 weeks:                         3. Improve FOTO score to predicted outcome for improved ability for daily tasks              PN: 58              2. The pt will demonstrate good plank test for 30 sec to improve ability for functional tasks              PN: Progressing Alternate knees and feet to maintain TA contraction 5 sec intervals for 15 sec.               3. The pt will report no limitation with walking several blocks              PN: Pain after walking in [place] shopping.              4. The pt will report at least 75% improvement for improved ability for daily activities, and return to recreation. PN: 50 % improvement              5.  Increased TA strength to 3/5 for improved spinal stabilization with functional movement.                PN:  TA 1/5 draw in only    PLAN  []  Upgrade activities as tolerated     [x]  Continue plan of care  []  Update interventions per flow sheet       []  Discharge due to:_  []  Other:_      Mukul Mendez, PT 12/20/2021  8:49 AM    Future Appointments   Date Time Provider Gardenia Hernandez   12/20/2021  9:00 AM Florina Bonilla, PT [facility]   12/28/2021  9:30 AM [facility] [address]   1/17/2022  8:30 AM Bowen Chance MD [facility]

## 2021-12-28 ENCOUNTER — HOSPITAL ENCOUNTER (OUTPATIENT)
Dept: NUTRITION | Age: 41
Discharge: HOME | End: 2021-12-28
Attending: FAMILY MEDICINE
Payer: COMMERCIAL

## 2021-12-28 PROCEDURE — 97802 MEDICAL NUTRITION INDIV IN: CPT

## 2021-12-28 NOTE — PROGRESS NOTES
[address] [place] [address]     Nutrition Assessment - Medical Nutrition Therapy   Outpatient Initial Evaluation         Patient Name: Francine Correa : 1980   Treatment Diagnosis: Fatigue, BMI 30-30.9   Referral Source: Sergio Atkins MD [facility]): 2021     Gender: female Age: 42 y.o. Ht: 63 in Wt: 165  lb  kg   BMI: 29.2 BMR   Male  BMR Female 1378     Past Medical History:  High Cholesterol, Pre Diabetes,      Pertinent Medications:   Patient is not currently taking any medications. Biochemical Data:   Lab Results   Component Value Date/Time    Hemoglobin A1c 5.9 (H) 11/10/2021 09:29 AM     Lab Results   Component Value Date/Time    Sodium 138 11/10/2021 09:29 AM    Potassium 4.1 11/10/2021 09:29 AM    Chloride 105 11/10/2021 09:29 AM    CO2 29 11/10/2021 09:29 AM    Anion gap 4 11/10/2021 09:29 AM    Glucose 95 11/10/2021 09:29 AM    BUN 13 11/10/2021 09:29 AM    Creatinine 0.80 11/10/2021 09:29 AM    BUN/Creatinine ratio 16 11/10/2021 09:29 AM    GFR est AA >60 11/10/2021 09:29 AM    GFR est non-AA >60 11/10/2021 09:29 AM    Calcium 8.6 11/10/2021 09:29 AM    Bilirubin, total 0.4 11/10/2021 09:29 AM    Alk. phosphatase 61 11/10/2021 09:29 AM    Protein, total 7.4 11/10/2021 09:29 AM    Albumin 3.6 11/10/2021 09:29 AM    Globulin 3.8 11/10/2021 09:29 AM    A-G Ratio 0.9 11/10/2021 09:29 AM    ALT (SGPT) 35 11/10/2021 09:29 AM    AST (SGOT) 34 11/10/2021 09:29 AM     Lab Results   Component Value Date/Time    Cholesterol, total 211 (H) 11/10/2021 09:29 AM    HDL Cholesterol 61 (H) 11/10/2021 09:29 AM    LDL, calculated 123.8 (H) 11/10/2021 09:29 AM    VLDL, calculated 26.2 11/10/2021 09:29 AM    Triglyceride 131 11/10/2021 09:29 AM    CHOL/HDL Ratio 3.5 11/10/2021 09:29 AM     Lab Results   Component Value Date/Time    ALT (SGPT) 35 11/10/2021 09:29 AM    AST (SGOT) 34 11/10/2021 09:29 AM    Alk.  phosphatase 61 11/10/2021 09:29 AM    Bilirubin, direct 0.1 07/13/2016 10:15 AM    Bilirubin, total 0.4 11/10/2021 09:29 AM     Lab Results   Component Value Date/Time    Creatinine 0.80 11/10/2021 09:29 AM     Lab Results   Component Value Date/Time    BUN 13 11/10/2021 09:29 AM     No results found for: MCACR, MCA1, MCA2, MCA3, MCAU, MCAU2, MCALPOCT     Assessment:    Patient present to learn about nutrition related to weight loss and lowering cholesterol levels. Patient reports that she makes changes to her diet but is never able to stick with changes long term. Patient feels that she gets very bored with \"healthy foods\". Patient reports that she has been depending on fast-food and take-out food often due to long work hours. Patient reports that she feels stressed due to work and taking care of her kids. Food & Nutrition: 24 Hour Recall:  Breakfast: guzmán, eggs, rice, cheese   Lunch: [employer]  Dinner: takeout fried rice with chicken  Drinks: wine 2-3 glasses, water, tea   Snacks: chips, nuts, candy        Estimate Needs   Calories:  1500 Protein: 94 Carbs: 169 Fat: 50   Kcal/day  g/day  g/day  g/day        percent: 25  45  30               Nutrition Diagnosis Obesity related to excessive carbohydrate intake as evidenced by 24 hour recall of carbohydrate dense foods (wine, chips, candy, fried rice). Nutrition Intervention &  Education: Educated patient on the need for a consistent carbohydrate intake related to diabetic control and weight loss. Educated patient on nutrition label reading, emphasizing carbohydrates and serving size. Educated patient on protein sources, encouraged patient to incorporate mostly lean protein source with all carbohydrates. Encouraged patient to exercise 150 minutes per week.     Handouts Provided: [x]  Carbohydrates  [x]  Protein  [x]  Non-starchy Vegatbles  [x]  Food Label  [x]  Meal and Snack Ideas  []  Food Journals []  Diabetes  []  Cholesterol  []  Sodium  [x]  Gen Nutr Guidelines  []  SBGM Guidelines  []  Others: Information Reviewed with: Patient    Readiness to Change Stage: []  Pre-contemplative    []  Contemplative  [x]  Preparation               []  Action                  []  Maintenance   Potential Barriers to Learning: []  Decline in memory    []  Language barrier   []  Other:  []  Emotional                  []  Limited mobility  [x]  Lack of motivation     [] Vision, hearing or cognitive impairment   Expected Compliance: Fair      Nutritional Goal - To promote lifestyle changes to result in:    [x]  Weight loss  []  Improved diabetic control  []  Decreased cholesterol levels  []  Decreased blood pressure  []  Weight maintenance []  Preventing any interactions associated with food allergies  []  Adequate weight gain toward goal weight  []  Other:        Patient Goals:   1. Patient will consume three meals per day 4-5 hours apart. 2. Patient will include a protein at all meals and snacks. 3. Patient will drink 64 ounces of water daily.       Dietitian Signature: Jah Jurado RD Date: 12/28/2021   Follow-up:  Time: 12:39 PM

## 2021-12-29 ENCOUNTER — APPOINTMENT (OUTPATIENT)
Dept: PHYSICAL THERAPY | Age: 41
End: 2021-12-29
Attending: FAMILY MEDICINE
Payer: COMMERCIAL

## 2022-01-07 ENCOUNTER — HOSPITAL ENCOUNTER (OUTPATIENT)
Dept: PHYSICAL THERAPY | Age: 42
Discharge: HOME | End: 2022-01-07
Attending: FAMILY MEDICINE
Payer: COMMERCIAL

## 2022-01-07 PROCEDURE — 97140 MANUAL THERAPY 1/> REGIONS: CPT

## 2022-01-07 PROCEDURE — 97112 NEUROMUSCULAR REEDUCATION: CPT

## 2022-01-07 PROCEDURE — 97110 THERAPEUTIC EXERCISES: CPT

## 2022-01-07 NOTE — PROGRESS NOTES
PT DAILY TREATMENT NOTE 10-18    Patient Name: Francine Correa  Date:2022  : 1980  [x]  Patient  Verified  Payor: [place]as / Plan: Jason Navarrete [id number] PPO / Product Type: PPO /    In time:859  Out time:944  Total Treatment Time (min): 45  Visit #: 1 of     Medicare/BCBS Only   Total Timed Codes (min):  45 1:1 Treatment Time:  45       Treatment Area: Other low back pain [M54.59]    SUBJECTIVE  Pain Level (0-10 scale): 0  Any medication changes, allergies to medications, adverse drug reactions, diagnosis change, or new procedure performed?: [x] No    [] Yes (see summary sheet for update)  Subjective functional status/changes:   [] No changes reported  I'm not really having much pain, but I haven't done much either. OBJECTIVE      8 min Therapeutic Exercise:  [] See flow sheet :   Rationale: increase ROM and increase strength to improve the patients ability to improve T/S mobility to decreased L/S paraspinals overuse     29 min Neuromuscular Re-education:  []  See flow sheet :   Rationale: increase ROM, increase strength, improve coordination, improve balance and increase proprioception  to improve the patients ability to recruit anterior and posterior chain appropriatley for overall postural awareness for daily activities     8 min Manual Therapy:  MFR/STM rectus and floating ribs   The manual therapy interventions were performed at a separate and distinct time from the therapeutic activities interventions.   Rationale: decrease pain, increase ROM, increase tissue extensibility and increase postural awareness to improve rib depression and TA recruitment   With   [] TE   [] TA   [] neuro   [] other: Patient Education: [x] Review HEP    [] Progressed/Changed HEP based on:   [] positioning   [] body mechanics   [] transfers   [] heat/ice application    [] other:      Other Objective/Functional Measures:      Pain Level (0-10 scale) post treatment: 0    ASSESSMENT/Changes in Function: Patient experienced abdominal cramping during first rep of foam roll thoracic extension in kneeling position, but did not experience spasm again once therapist cueing patient to recruit TA. Patient will continue to benefit from skilled PT services to modify and progress therapeutic interventions, address functional mobility deficits, address ROM deficits, address strength deficits, analyze and address soft tissue restrictions, analyze and cue movement patterns and assess and modify postural abnormalities to attain remaining goals. []  See Plan of Care  []  See progress note/recertification  []  See Discharge Summary         Progress towards goals / Updated goals:  Updated Goals: to be achieved in 4-6 weeks:                         8. Improve FOTO score to predicted outcome for improved ability for daily tasks              PN: 58              2. The pt will demonstrate good plank test for 30 sec to improve ability for functional tasks              PN: Progressing Alternate knees and feet to maintain TA contraction 5 sec intervals for 15 sec.               3. The pt will report no limitation with walking several blocks              PN: Pain after walking in [place] shopping.              4. The pt will report at least 75% improvement for improved ability for daily activities, and return to recreation. PN: 50 % improvement              5.  Increased TA strength to 3/5 for improved spinal stabilization with functional movement.                PN:  TA 1/5 draw in only    PLAN  []  Upgrade activities as tolerated     [x]  Continue plan of care  []  Update interventions per flow sheet       []  Discharge due to:_  []  Other:_      Dorie Small, KI, CMTPT 1/7/2022  8:54 AM    Future Appointments   Date Time Provider Gardenia Hernandez   1/7/2022  9:00 AM [facility]   1/10/2022  8:15 AM [facility]   1/14/2022  9:00 AM [facility]   1/17/2022  8:30 AM Joyec Arteaga MD [facility]   1/21/2022  9:00 AM Archie Sidhu, PT [facility]   1/24/2022  8:15 AM Milton Galvan, PT [facility]   1/31/2022  8:15 AM Samantha Najera, PT [facility]

## 2022-01-08 ENCOUNTER — TELEPHONE (OUTPATIENT)
Dept: FAMILY MEDICINE CLINIC | Age: 42
End: 2022-01-08

## 2022-01-08 NOTE — TELEPHONE ENCOUNTER
----- Message from Manuel Moeller sent at 1/3/2022  4:27 PM EST -----  Subject: Message to Provider    QUESTIONS  Information for Provider? patient requesting call from nurse in regards to   lab work. ..  ---------------------------------------------------------------------------  --------------  CALL BACK INFO  What is the best way for the office to contact you? OK to leave message on   voicemail  Preferred Call Back Phone Number? [phone]  ---------------------------------------------------------------------------  --------------  SCRIPT ANSWERS  Relationship to Patient?  Self

## 2022-01-08 NOTE — TELEPHONE ENCOUNTER
Judge Meyer [phone] ([place])  for patient to contact [facility] or send [employer] message with concerns.

## 2022-01-10 ENCOUNTER — APPOINTMENT (OUTPATIENT)
Dept: PHYSICAL THERAPY | Age: 42
End: 2022-01-10
Attending: FAMILY MEDICINE
Payer: COMMERCIAL

## 2022-01-12 NOTE — TELEPHONE ENCOUNTER
Spoke with patient who saw OB/GYN and was told that she needs a hysterectomy. Per patient she was advised that she has an enlarged uterus and an ultrasound was performed. The ultrasound was normal. Patient does have heavy periods but wants to explore other options and get a second opinion. The GYN that she saw was new to her-Dr. Ale Suarez (her previous GYN retired). She is requesting other GYN offices in the area. GYN resources sent to patient via [address] [place] [address] [phone].

## 2022-01-14 ENCOUNTER — HOSPITAL ENCOUNTER (OUTPATIENT)
Dept: PHYSICAL THERAPY | Age: 42
End: 2022-01-14
Attending: FAMILY MEDICINE
Payer: COMMERCIAL

## 2022-01-17 ENCOUNTER — APPOINTMENT (OUTPATIENT)
Dept: PHYSICAL THERAPY | Age: 42
End: 2022-01-17
Attending: FAMILY MEDICINE
Payer: COMMERCIAL

## 2022-01-24 ENCOUNTER — HOSPITAL ENCOUNTER (OUTPATIENT)
Dept: PHYSICAL THERAPY | Age: 42
Discharge: HOME | End: 2022-01-24
Attending: FAMILY MEDICINE
Payer: COMMERCIAL

## 2022-01-24 PROCEDURE — 97140 MANUAL THERAPY 1/> REGIONS: CPT

## 2022-01-24 PROCEDURE — 97110 THERAPEUTIC EXERCISES: CPT

## 2022-01-24 PROCEDURE — 97112 NEUROMUSCULAR REEDUCATION: CPT

## 2022-01-24 NOTE — PROGRESS NOTES
PT DAILY TREATMENT NOTE     Patient Name: Alberto Fernandez  Date:2022  : 1980  [x]  Patient  Verified  Payor: Morris Mike / Plan: Jason Navarrete [id number] PPO / Product Type: PPO /    In time:815  Out time:900  Total Treatment Time (min): 45  Visit #: 1 of 8    Medicare/BCBS Only   Total Timed Codes (min):  45 1:1 Treatment Time:  45       Treatment Area: Other low back pain [M54.59]    SUBJECTIVE  Pain Level (0-10 scale): 0/10  Any medication changes, allergies to medications, adverse drug reactions, diagnosis change, or new procedure performed?: [x] No    [] Yes (see summary sheet for update)  Subjective functional status/changes:   [] No changes reported  \" I have been dealing with OBGYN, enlarged uterus  Lots of pain and uncertainty with POC forward. \" I have been wearing Abdominal /brace to help hold in\"    OBJECTIVE      8 min Therapeutic Exercise:  [x] See flow sheet :   Rationale: increase ROM and increase strength to improve the patients ability to perform ADL's with ease. 29 min Neuromuscular Re-education:  [x]  See flow sheet :   Rationale: increase strength and increase proprioception  to improve the patients ability to stabilize spine and reduce flare ups. 8 min Manual Therapy:  STM/MFR to Right rectus and costal mms of lower ribs   The manual therapy interventions were performed at a separate and distinct time from the therapeutic activities interventions. Rationale: decrease pain and increase tissue extensibility to perform self care with ease.              With   [x] TE   [] TA   [x] neuro   [] other: Patient Education: [x] Review HEP    [] Progressed/Changed HEP based on:   [] positioning   [] body mechanics   [] transfers   [] heat/ice application    [] other:      Other Objective/Functional Measures: TVA 1/5     Pain Level (0-10 scale) post treatment: 0/10    ASSESSMENT/Changes in Function: Patient mild set back from recent inconsistency due to medical appointments for OBGYN. Will resume stability program per POC. Patient will continue to benefit from skilled PT services to modify and progress therapeutic interventions, address functional mobility deficits, address ROM deficits, address strength deficits, analyze and address soft tissue restrictions, analyze and cue movement patterns and assess and modify postural abnormalities to attain remaining goals. [x]  See Plan of Care  []  See progress note/recertification  []  See Discharge Summary         Progress towards goals / Updated goals:  Updated Goals: to be achieved in 4-6 weeks:                         1. Improve FOTO score to predicted outcome for improved ability for daily tasks              PN: 58              2. The pt will demonstrate good plank test for 30 sec to improve ability for functional tasks              PN: Progressing Alternate knees and feet to maintain TA contraction 5 sec intervals for 15 sec.               3. The pt will report no limitation with walking several blocks              PN: Pain after walking in [place] shopping.              4.  The pt will report at least 75% improvement for improved ability for daily activities, and return to recreation.               PN: 50 % improvement              5.  Increased TA strength to 3/5 for improved spinal stabilization with functional movement.               PN:  TA 1/5 draw in only    PLAN  []  Upgrade activities as tolerated     [x]  Continue plan of care  []  Update interventions per flow sheet       []  Discharge due to:_  []  Other:_      Ramila Olivera, PT 1/24/2022  7:50 AM    Future Appointments   Date Time Provider Gardenia Hernandez   1/24/2022  8:15 AM Chaitanya Mg, PT [facility]   1/31/2022  8:15 AM Catalina Najera, PT [facility]

## 2022-01-24 NOTE — PROGRESS NOTES
[facility] [address]  [place], [address]  [phone] fax    Physical Therapy Progress Note  Patient name: Zion George Start of Care: 21   Referral source: Fawad Casillas MD : 1980   Medical/Treatment Diagnosis: Other low back pain [M54.59]  Payor: BLUE CROSS / Plan: Jason Navarrete [id number] PPO / Product Type: PPO /  Onset Date:Chronic, increased past year     Prior Hospitalization: see medical history Provider#: [id number]   Medications: Verified on Patient Summary List    Comorbidities: Latex Allergy, LBP, Csection  Prior Level of Function Functional with all activities  Visits from Start of Care: 6    Missed Visits: 3    Key Functional Changes: Patient has only been seen one visit in past month due to other medical issues. Updated Goals: to be achieved in 4 weeks:   Updated Goals: to be achieved in 4-6 weeks:                         1. Improve FOTO score to predicted outcome for improved ability for daily tasks              PN: 58              2. The pt will demonstrate good plank test for 30 sec to improve ability for functional tasks              PN: Progressing Alternate knees and feet to maintain TA contraction 5 sec intervals for 15 sec.               3. The pt will report no limitation with walking several blocks              PN: Pain after walking in [place] shopping.              4. The pt will report at least 75% improvement for improved ability for daily activities, and return to recreation.               PN: 50 % improvement              5.  Increased TA strength to 3/5 for improved spinal stabilization with functional movement.               PN:  TA 1/5 draw in only    ASSESSMENT/RECOMMENDATIONS:  Continue to increase core stability to prevent rectus spasm.      [x]Continue therapy per initial plan/protocol at a frequency of  2 x per week for 4 weeks  []Continue therapy with the following recommended changes:_____________________      _____________________________________________________________________  []Discontinue therapy progressing towards or have reached established goals  []Discontinue therapy due to lack of appreciable progress towards goals  []Discontinue therapy due to lack of attendance or compliance  []Await Physician's recommendations/decisions regarding therapy  []Other:________________________________________________________________    Thank you for this referral.    Memo Low, PT 1/24/2022 2:00 PM  NOTE TO PHYSICIAN:  PLEASE COMPLETE THE ORDERS BELOW AND   FAX TO [facility]: [phone]  If you are unable to process this request in 24 hours please contact our office: [phone]    ? I have read the above report and request that my patient continue as recommended. ? I have read the above report and request that my patient continue therapy with the following changes/special instructions:__________________________________________________________  ? I have read the above report and request that my patient be discharged from therapy.     Physicians signature: ______________________________Date: ______Time:______     Francisco Kohli MD

## 2022-01-31 ENCOUNTER — HOSPITAL ENCOUNTER (OUTPATIENT)
Dept: PHYSICAL THERAPY | Age: 42
Discharge: HOME | End: 2022-01-31
Attending: FAMILY MEDICINE
Payer: COMMERCIAL

## 2022-01-31 PROCEDURE — 97140 MANUAL THERAPY 1/> REGIONS: CPT

## 2022-01-31 PROCEDURE — 97112 NEUROMUSCULAR REEDUCATION: CPT

## 2022-01-31 PROCEDURE — 97110 THERAPEUTIC EXERCISES: CPT

## 2022-02-07 ENCOUNTER — APPOINTMENT (OUTPATIENT)
Dept: PHYSICAL THERAPY | Age: 42
End: 2022-02-07
Attending: FAMILY MEDICINE

## 2022-02-11 ENCOUNTER — APPOINTMENT (OUTPATIENT)
Dept: PHYSICAL THERAPY | Age: 42
End: 2022-02-11
Attending: FAMILY MEDICINE

## 2022-02-14 ENCOUNTER — APPOINTMENT (OUTPATIENT)
Dept: PHYSICAL THERAPY | Age: 42
End: 2022-02-14
Attending: FAMILY MEDICINE

## 2022-02-14 ENCOUNTER — TELEPHONE (OUTPATIENT)
Dept: PHYSICAL THERAPY | Age: 42
End: 2022-02-14

## 2022-02-14 NOTE — TELEPHONE ENCOUNTER
Patient called to cxl for personal reasons, stated PT [facility]) would know what she is ref.  To.

## 2022-02-21 ENCOUNTER — APPOINTMENT (OUTPATIENT)
Dept: PHYSICAL THERAPY | Age: 42
End: 2022-02-21
Attending: FAMILY MEDICINE

## 2022-02-25 ENCOUNTER — HOSPITAL ENCOUNTER (OUTPATIENT)
Dept: PHYSICAL THERAPY | Age: 42
End: 2022-02-25
Attending: FAMILY MEDICINE

## 2022-03-19 PROBLEM — E55.9 VITAMIN D DEFICIENCY: Status: ACTIVE | Noted: 2021-11-12

## 2022-03-19 PROBLEM — M62.08 DIASTASIS RECTI: Status: ACTIVE | Noted: 2020-07-29

## 2022-03-19 PROBLEM — K30 INDIGESTION: Status: ACTIVE | Noted: 2020-07-29

## 2022-03-19 PROBLEM — Z86.2 H/O SICKLE CELL TRAIT: Status: ACTIVE | Noted: 2020-07-29

## 2022-03-20 PROBLEM — R73.01 IMPAIRED FASTING BLOOD SUGAR: Status: ACTIVE | Noted: 2021-11-12

## 2022-03-20 PROBLEM — E61.1 IRON DEFICIENCY: Status: ACTIVE | Noted: 2021-11-12

## 2022-03-23 ENCOUNTER — TELEPHONE (OUTPATIENT)
Dept: PHYSICAL THERAPY | Age: 42
End: 2022-03-23

## 2022-03-23 NOTE — PROGRESS NOTES
[facility]  [address] [facility] [address]  [place], [address]  [phone] fax    Physical Therapy Discharge Summary  Patient name: Efren Zhu Start of Care: 21   Referral source: Gage Martel MD : 1980   Medical/Treatment Diagnosis: Other low back pain [M54.59]  Payor: BLUE CROSS / Plan: Jason Navarrete [id number] PPO / Product Type: PPO /  Onset Date:Chronic, Increased past year     Prior Hospitalization: see medical history Provider#: [id number]   Medications: Verified on Patient Summary List    Comorbidities: Latex Allergy, , LBP  Prior Level of Function:Functional with all activities  Visits from Start of Care: 7    Missed Visits: 4  Reporting Period : 22 to 22      Summary of Care:  Patient has not returned to PT since 22 due to personal situation; unable to reassess goals; unplanned D/C.         ASSESSMENT/RECOMMENDATIONS:  [x]Discontinue therapy: []Patient has reached or is progressing toward set goals      [x]Patient is non-compliant or has abdicated      []Due to lack of appreciable progress towards set goals    [facility], PT 3/23/2022 7:27 PM

## 2022-03-24 ENCOUNTER — TELEPHONE (OUTPATIENT)
Dept: FAMILY MEDICINE CLINIC | Age: 42
End: 2022-03-24

## 2022-03-24 NOTE — TELEPHONE ENCOUNTER
----- Message from Patricia Fofana sent at 3/23/2022  9:50 AM EDT -----  Subject: Message to Provider    QUESTIONS  Information for Provider? Pt is requesting a call back from Dr. Altagracia Dejesus   nurse. The call is in regards to a f/u visit from her OBGYN. She has a   question for the nurse before she schedule an appt. with doctor Barrett Tobias. Pt   is requesting a call back before 10 a.m . If not, please give her a call   on Thursday or Friday.  ---------------------------------------------------------------------------  --------------  CALL BACK INFO  What is the best way for the office to contact you? OK to leave message on   voicemail  Preferred Call Back Phone Number? [phone]  ---------------------------------------------------------------------------  --------------  SCRIPT ANSWERS  Relationship to Patient?  Self

## 2022-06-14 ENCOUNTER — HOSPITAL ENCOUNTER (OUTPATIENT)
Age: 42
Discharge: HOME | End: 2022-06-14
Attending: RADIOLOGY
Payer: COMMERCIAL

## 2022-06-14 VITALS — WEIGHT: 173 LBS | BODY MASS INDEX: 29.93 KG/M2

## 2022-06-14 DIAGNOSIS — D25.9 LEIOMYOMA OF UTERUS, UNSPECIFIED: ICD-10-CM

## 2022-06-14 PROCEDURE — 72197 MRI PELVIS W/O & W/DYE: CPT

## 2022-06-14 PROCEDURE — 74011250636 HC RX REV CODE- 250/636: Performed by: RADIOLOGY

## 2022-06-14 PROCEDURE — A9576 INJ PROHANCE MULTIPACK: HCPCS | Performed by: RADIOLOGY

## 2022-06-14 RX ADMIN — GADOTERIDOL 20 ML: 279.3 INJECTION, SOLUTION INTRAVENOUS at 16:28

## 2022-07-27 ENCOUNTER — HOSPITAL ENCOUNTER (OUTPATIENT)
Dept: LAB | Age: 42
Discharge: HOME | End: 2022-07-27
Payer: COMMERCIAL

## 2022-07-27 LAB
ANION GAP SERPL CALC-SCNC: 4 MMOL/L (ref 3–18)
BUN SERPL-MCNC: 14 MG/DL (ref 7–18)
BUN/CREAT SERPL: 15 (ref 12–20)
CALCIUM SERPL-MCNC: 8.7 MG/DL (ref 8.5–10.1)
CHLORIDE SERPL-SCNC: 104 MMOL/L (ref 100–111)
CO2 SERPL-SCNC: 29 MMOL/L (ref 21–32)
CREAT SERPL-MCNC: 0.96 MG/DL (ref 0.6–1.3)
ERYTHROCYTE [DISTWIDTH] IN BLOOD BY AUTOMATED COUNT: 19.9 % (ref 11.6–14.5)
GLUCOSE SERPL-MCNC: 105 MG/DL (ref 74–99)
HCT VFR BLD AUTO: 39.9 % (ref 35–45)
HGB BLD-MCNC: 12.7 G/DL (ref 12–16)
INR PPP: 1.1 (ref 0.8–1.2)
MCH RBC QN AUTO: 25 PG (ref 24–34)
MCHC RBC AUTO-ENTMCNC: 31.8 G/DL (ref 31–37)
MCV RBC AUTO: 78.4 FL (ref 78–100)
NRBC # BLD: 0 K/UL (ref 0–0.01)
NRBC BLD-RTO: 0 PER 100 WBC
PLATELET # BLD AUTO: 282 K/UL (ref 135–420)
PMV BLD AUTO: 10.6 FL (ref 9.2–11.8)
POTASSIUM SERPL-SCNC: 4.5 MMOL/L (ref 3.5–5.5)
PROTHROMBIN TIME: 14.2 SEC (ref 11.5–15.2)
RBC # BLD AUTO: 5.09 M/UL (ref 4.2–5.3)
SODIUM SERPL-SCNC: 137 MMOL/L (ref 136–145)
WBC # BLD AUTO: 5.1 K/UL (ref 4.6–13.2)

## 2022-07-27 PROCEDURE — 80048 BASIC METABOLIC PNL TOTAL CA: CPT

## 2022-07-27 PROCEDURE — 85610 PROTHROMBIN TIME: CPT

## 2022-07-27 PROCEDURE — 85027 COMPLETE CBC AUTOMATED: CPT

## 2022-07-27 PROCEDURE — 36415 COLL VENOUS BLD VENIPUNCTURE: CPT

## 2022-08-03 PROBLEM — D25.9 UTERINE FIBROID: Status: ACTIVE | Noted: 2022-08-03

## 2022-11-14 NOTE — PROGRESS NOTES
1. \"Have you been to the ER, urgent care clinic since your last visit? Hospitalized since your last visit? \" Yes When: [facility] 8/03/22 - 8/04/22 for uterine fibroid. [facility] 6/22/22 for leiomyoma of uterus, unspecified. 2. \"Have you seen or consulted any other health care providers outside of the [address] [place] [address] since your last visit? \" Dr. Shannan Shane 11/09/22 for pap smear. 3. For patients aged 39-70: Has the patient had a colonoscopy / FIT/ Cologuard? No      If the patient is female:    4. For patients aged 41-77: Has the patient had a mammogram within the past 2 years? No. Patient has mammogram scheduled in December 2022 in [place]. 5. For patients aged 21-65: Has the patient had a pap smear?  Yes - no Care Gap present 11/09/22    Chief Complaint   Patient presents with    Well Woman

## 2022-11-15 ENCOUNTER — OFFICE VISIT (OUTPATIENT)
Dept: FAMILY MEDICINE CLINIC | Age: 42
End: 2022-11-15
Payer: COMMERCIAL

## 2022-11-15 ENCOUNTER — HOSPITAL ENCOUNTER (OUTPATIENT)
Dept: LAB | Age: 42
Discharge: HOME | End: 2022-11-15
Payer: COMMERCIAL

## 2022-11-15 VITALS
OXYGEN SATURATION: 100 % | DIASTOLIC BLOOD PRESSURE: 76 MMHG | HEIGHT: 64 IN | RESPIRATION RATE: 16 BRPM | WEIGHT: 171 LBS | BODY MASS INDEX: 29.19 KG/M2 | SYSTOLIC BLOOD PRESSURE: 112 MMHG | HEART RATE: 79 BPM | TEMPERATURE: 97.1 F

## 2022-11-15 DIAGNOSIS — M62.08 DIASTASIS RECTI: ICD-10-CM

## 2022-11-15 DIAGNOSIS — E78.5 DYSLIPIDEMIA: ICD-10-CM

## 2022-11-15 DIAGNOSIS — E61.1 IRON DEFICIENCY: ICD-10-CM

## 2022-11-15 DIAGNOSIS — R73.01 IMPAIRED FASTING BLOOD SUGAR: Primary | ICD-10-CM

## 2022-11-15 DIAGNOSIS — Z87.19 H/O HIATAL HERNIA: ICD-10-CM

## 2022-11-15 DIAGNOSIS — D25.9 UTERINE LEIOMYOMA, UNSPECIFIED LOCATION: ICD-10-CM

## 2022-11-15 DIAGNOSIS — Z86.2 H/O SICKLE CELL TRAIT: ICD-10-CM

## 2022-11-15 DIAGNOSIS — E55.9 VITAMIN D DEFICIENCY: ICD-10-CM

## 2022-11-15 DIAGNOSIS — R73.01 IMPAIRED FASTING BLOOD SUGAR: ICD-10-CM

## 2022-11-15 LAB
25(OH)D3 SERPL-MCNC: 33.3 NG/ML (ref 30–100)
CHOLEST SERPL-MCNC: 211 MG/DL
ERYTHROCYTE [DISTWIDTH] IN BLOOD BY AUTOMATED COUNT: 17.2 % (ref 11.6–14.5)
EST. AVERAGE GLUCOSE BLD GHB EST-MCNC: 120 MG/DL
HBA1C MFR BLD: 5.8 % (ref 4.2–5.6)
HCT VFR BLD AUTO: 41.3 % (ref 35–45)
HDLC SERPL-MCNC: 54 MG/DL (ref 40–60)
HDLC SERPL: 3.9 {RATIO} (ref 0–5)
HGB BLD-MCNC: 13.4 G/DL (ref 12–16)
IRON SATN MFR SERPL: 31 % (ref 20–50)
IRON SERPL-MCNC: 107 UG/DL (ref 50–175)
LDLC SERPL CALC-MCNC: 135 MG/DL (ref 0–100)
LIPID PROFILE,FLP: ABNORMAL
MCH RBC QN AUTO: 25.5 PG (ref 24–34)
MCHC RBC AUTO-ENTMCNC: 32.4 G/DL (ref 31–37)
MCV RBC AUTO: 78.5 FL (ref 78–100)
NRBC # BLD: 0 K/UL (ref 0–0.01)
NRBC BLD-RTO: 0 PER 100 WBC
PLATELET # BLD AUTO: 305 K/UL (ref 135–420)
PMV BLD AUTO: 10.6 FL (ref 9.2–11.8)
RBC # BLD AUTO: 5.26 M/UL (ref 4.2–5.3)
TIBC SERPL-MCNC: 350 UG/DL (ref 250–450)
TRIGL SERPL-MCNC: 110 MG/DL (ref ?–150)
VLDLC SERPL CALC-MCNC: 22 MG/DL
WBC # BLD AUTO: 4.5 K/UL (ref 4.6–13.2)

## 2022-11-15 PROCEDURE — 80061 LIPID PANEL: CPT

## 2022-11-15 PROCEDURE — 83036 HEMOGLOBIN GLYCOSYLATED A1C: CPT

## 2022-11-15 PROCEDURE — 82306 VITAMIN D 25 HYDROXY: CPT

## 2022-11-15 PROCEDURE — 36415 COLL VENOUS BLD VENIPUNCTURE: CPT

## 2022-11-15 PROCEDURE — 85027 COMPLETE CBC AUTOMATED: CPT

## 2022-11-15 PROCEDURE — 99214 OFFICE O/P EST MOD 30 MIN: CPT | Performed by: FAMILY MEDICINE

## 2022-11-15 PROCEDURE — 83540 ASSAY OF IRON: CPT

## 2022-11-16 NOTE — PROGRESS NOTES
HISTORY OF PRESENT ILLNESS  Velma Garvey is a 36 y.o. female. HPI: Here initially for physical but switch to follow up visit. Pt had physical with ob/gyn last week. Up to date with clinical breast exam and pap smear. Seen after one year. H/o prediabetes. Discussed diet and life style modification. She will work on it with more compliance. Discussed high BMI. She agrees to go for medical weight loss program. Done a referral as well. Diastasis recti. Wanted to get a recommendations from surgeon regarding surgery. Done a referral.  Sitting without any acute distress. Mildly elevated lipid panel. Again discussed life style and diet modification. H/o uterine fibroid. Post embolization. Iron deficiency anemia. H/o sickle cell trait. For now will repeat labs. Seen hematology in the past. For now on iron supplement. Denies any headache, dizziness, no chest pain or trouble breathing, no arm or leg weakness. No nausea or vomiting, no weight or appetite changes, no mood changes . No urine or bowel complains, no palpitation, no diaphoresis. No abdominal pain. No cold or cough. No leg swelling. No fever. No sleep trouble. Visit Vitals  /76 (BP 1 Location: Left upper arm, BP Patient Position: Sitting, BP Cuff Size: Adult)   Pulse 79   Temp 97.1 °F (36.2 °C) (Temporal)   Resp 16   Ht 5' 3.5\" (1.613 m)   Wt 171 lb (77.6 kg)   SpO2 100%   BMI 29.82 kg/m²     Review medication list, vitals, problem list,allergies.    Lab Results   Component Value Date/Time    WBC 4.5 (L) 11/15/2022 10:29 AM    HGB 13.4 11/15/2022 10:29 AM    HCT 41.3 11/15/2022 10:29 AM    PLATELET 232 46/48/5560 10:29 AM    MCV 78.5 11/15/2022 10:29 AM     Lab Results   Component Value Date/Time    Sodium 137 07/27/2022 10:02 AM    Potassium 4.5 07/27/2022 10:02 AM    Chloride 104 07/27/2022 10:02 AM    CO2 29 07/27/2022 10:02 AM    Anion gap 4 07/27/2022 10:02 AM    Glucose 105 (H) 07/27/2022 10:02 AM    BUN 14 07/27/2022 10:02 AM    Creatinine 0.96 07/27/2022 10:02 AM    BUN/Creatinine ratio 15 07/27/2022 10:02 AM    GFR est AA >60 07/27/2022 10:02 AM    GFR est non-AA >60 07/27/2022 10:02 AM    Calcium 8.7 07/27/2022 10:02 AM    Bilirubin, total 0.4 11/10/2021 09:29 AM    Alk. phosphatase 61 11/10/2021 09:29 AM    Protein, total 7.4 11/10/2021 09:29 AM    Albumin 3.6 11/10/2021 09:29 AM    Globulin 3.8 11/10/2021 09:29 AM    A-G Ratio 0.9 11/10/2021 09:29 AM    ALT (SGPT) 35 11/10/2021 09:29 AM    AST (SGOT) 34 11/10/2021 09:29 AM     Lab Results   Component Value Date/Time    Cholesterol, total 211 (H) 11/15/2022 10:29 AM    HDL Cholesterol 54 11/15/2022 10:29 AM    LDL, calculated 135 (H) 11/15/2022 10:29 AM    VLDL, calculated 22 11/15/2022 10:29 AM    Triglyceride 110 11/15/2022 10:29 AM    CHOL/HDL Ratio 3.9 11/15/2022 10:29 AM     Lab Results   Component Value Date/Time    TSH 1.77 11/10/2021 09:29 AM     Lab Results   Component Value Date/Time    Hemoglobin A1c 5.8 (H) 11/15/2022 10:29 AM     Lab Results   Component Value Date/Time    Vitamin D 25-Hydroxy 33.3 11/15/2022 10:29 AM           ROS    Physical Exam    ASSESSMENT and PLAN    ICD-10-CM ICD-9-CM    1. Impaired fasting blood sugar : diet and life style modification. Recheck labs. R73.01 790.21 HEMOGLOBIN A1C WITH EAG      REFERRAL TO WEIGHT LOSS      2. H/O hiatal hernia : for now denies any GERD symptoms. Will observe. Z87.19 V12.79 REFERRAL TO GENERAL SURGERY      3. Diastasis recti : on and off bulging and discomfort. Sending to general surgeon for further recommendations regarding repair. Done PT but not much helpful. M62.08 728.84 REFERRAL TO GENERAL SURGERY      REFERRAL TO WEIGHT LOSS    was going to physical therapy. needed to go for reconstruction surgery. 4. Vitamin D deficiency : recheck labs. E55.9 268.9 VITAMIN D, 25 HYDROXY      5. Iron deficiency : taking iron supplement. Seen hematology. For now recheck labs.   E61.1 280.9 CBC W/O DIFF      CANCELED: IRON PROFILE 6. Uterine leiomyoma, unspecified location : following ob/gyn. Post embolization. D25.9 218.9     post uterine fibroid embolisation. 7. H/O sickle cell trait  Z86.2 V12.3       8. Dyslipidemia : diet and life style modification. E78.5 272.4 LIPID PANEL      9. BMI 29.0-29.9,adult : diet and life style modification. Will do a referral to medical weight loss program per their recommendations. Z68.29 V85.25 REFERRAL TO WEIGHT LOSS      CANCELED: REFERRAL TO WEIGHT LOSS      Pt understood and agree with the plan   Last pap smear last year. Negative. Sent report to scan. Discussed due immunization. She will get it from the pharmacy if decides to. Follow-up and Dispositions    Return in about 4 months (around 3/15/2023). Please note that this dictation was completed with [employer], the computer voice recognition software. Quite often unanticipated grammatical, syntax, homophones, and other interpretive errors are inadvertently transcribed by the computer software. Please disregard these errors. Please excuse any errors that have escaped final proofreading.

## 2022-11-16 NOTE — PROGRESS NOTES
A1C in prediabetic range. Work on diet and life style modification. Vitamin D wnl. Continue multivitamin. Iron level wnl. For now will hold off on iron supplement and take multivitamin otc. Lipid panel went up compare to before. Again low fat , low carb diet. Life style modification. Further discussion on follow up visit.

## 2023-02-14 DIAGNOSIS — M62.08 DIASTASIS RECTI: ICD-10-CM

## 2023-02-14 DIAGNOSIS — Z87.19 H/O HIATAL HERNIA: Primary | ICD-10-CM

## 2023-02-14 DIAGNOSIS — R73.01 IMPAIRED FASTING BLOOD SUGAR: Primary | ICD-10-CM

## 2023-06-27 SDOH — ECONOMIC STABILITY: TRANSPORTATION INSECURITY
IN THE PAST 12 MONTHS, HAS LACK OF TRANSPORTATION KEPT YOU FROM MEETINGS, WORK, OR FROM GETTING THINGS NEEDED FOR DAILY LIVING?: NO

## 2023-06-27 SDOH — ECONOMIC STABILITY: FOOD INSECURITY: WITHIN THE PAST 12 MONTHS, YOU WORRIED THAT YOUR FOOD WOULD RUN OUT BEFORE YOU GOT MONEY TO BUY MORE.: NEVER TRUE

## 2023-06-27 SDOH — ECONOMIC STABILITY: FOOD INSECURITY: WITHIN THE PAST 12 MONTHS, THE FOOD YOU BOUGHT JUST DIDN'T LAST AND YOU DIDN'T HAVE MONEY TO GET MORE.: NEVER TRUE

## 2023-06-27 SDOH — ECONOMIC STABILITY: HOUSING INSECURITY
IN THE LAST 12 MONTHS, WAS THERE A TIME WHEN YOU DID NOT HAVE A STEADY PLACE TO SLEEP OR SLEPT IN A SHELTER (INCLUDING NOW)?: NO

## 2023-06-27 SDOH — ECONOMIC STABILITY: INCOME INSECURITY: HOW HARD IS IT FOR YOU TO PAY FOR THE VERY BASICS LIKE FOOD, HOUSING, MEDICAL CARE, AND HEATING?: NOT VERY HARD

## 2023-06-29 ENCOUNTER — OFFICE VISIT (OUTPATIENT)
Age: 43
End: 2023-06-29
Payer: COMMERCIAL

## 2023-06-29 ENCOUNTER — HOSPITAL ENCOUNTER (OUTPATIENT)
Facility: HOSPITAL | Age: 43
End: 2023-06-29
Payer: COMMERCIAL

## 2023-06-29 ENCOUNTER — HOSPITAL ENCOUNTER (OUTPATIENT)
Facility: HOSPITAL | Age: 43
Discharge: HOME | End: 2023-06-29
Payer: COMMERCIAL

## 2023-06-29 VITALS
OXYGEN SATURATION: 98 % | HEIGHT: 64 IN | WEIGHT: 177 LBS | BODY MASS INDEX: 30.22 KG/M2 | DIASTOLIC BLOOD PRESSURE: 78 MMHG | TEMPERATURE: 98 F | RESPIRATION RATE: 16 BRPM | HEART RATE: 76 BPM | SYSTOLIC BLOOD PRESSURE: 126 MMHG

## 2023-06-29 DIAGNOSIS — M54.50 LUMBAR PAIN: ICD-10-CM

## 2023-06-29 DIAGNOSIS — R39.89 ABNORMAL URINE COLOR: ICD-10-CM

## 2023-06-29 DIAGNOSIS — M62.830 BACK SPASM: Primary | ICD-10-CM

## 2023-06-29 DIAGNOSIS — M62.830 BACK SPASM: ICD-10-CM

## 2023-06-29 DIAGNOSIS — R25.2 LEG CRAMP: ICD-10-CM

## 2023-06-29 LAB
ANION GAP SERPL CALC-SCNC: 6 MMOL/L (ref 3–18)
APPEARANCE UR: CLEAR
BACTERIA URNS QL MICRO: ABNORMAL /HPF
BASOPHILS # BLD: 0 K/UL (ref 0–0.1)
BASOPHILS NFR BLD: 1 % (ref 0–2)
BILIRUB UR QL: NEGATIVE
BUN SERPL-MCNC: 11 MG/DL (ref 7–18)
BUN/CREAT SERPL: 13 (ref 12–20)
CALCIUM SERPL-MCNC: 8.8 MG/DL (ref 8.5–10.1)
CHLORIDE SERPL-SCNC: 102 MMOL/L (ref 100–111)
CO2 SERPL-SCNC: 28 MMOL/L (ref 21–32)
COLOR UR: YELLOW
CREAT SERPL-MCNC: 0.88 MG/DL (ref 0.6–1.3)
DIFFERENTIAL METHOD BLD: ABNORMAL
EOSINOPHIL # BLD: 0.1 K/UL (ref 0–0.4)
EOSINOPHIL NFR BLD: 2 % (ref 0–5)
EPITH CASTS URNS QL MICRO: ABNORMAL /LPF (ref 0–5)
ERYTHROCYTE [DISTWIDTH] IN BLOOD BY AUTOMATED COUNT: 16.6 % (ref 11.6–14.5)
ERYTHROCYTE [SEDIMENTATION RATE] IN BLOOD: 13 MM/HR (ref 0–20)
GLUCOSE SERPL-MCNC: 106 MG/DL (ref 74–99)
GLUCOSE UR STRIP.AUTO-MCNC: NEGATIVE MG/DL
HCT VFR BLD AUTO: 42.6 % (ref 35–45)
HGB BLD-MCNC: 13.8 G/DL (ref 12–16)
HGB UR QL STRIP: ABNORMAL
IMM GRANULOCYTES # BLD AUTO: 0 K/UL (ref 0–0.04)
IMM GRANULOCYTES NFR BLD AUTO: 0 % (ref 0–0.5)
KETONES UR QL STRIP.AUTO: NEGATIVE MG/DL
LEUKOCYTE ESTERASE UR QL STRIP.AUTO: NEGATIVE
LYMPHOCYTES # BLD: 1.3 K/UL (ref 0.9–3.6)
LYMPHOCYTES NFR BLD: 33 % (ref 21–52)
MAGNESIUM SERPL-MCNC: 2.4 MG/DL (ref 1.6–2.6)
MCH RBC QN AUTO: 26.2 PG (ref 24–34)
MCHC RBC AUTO-ENTMCNC: 32.4 G/DL (ref 31–37)
MCV RBC AUTO: 80.8 FL (ref 78–100)
MONOCYTES # BLD: 0.4 K/UL (ref 0.05–1.2)
MONOCYTES NFR BLD: 11 % (ref 3–10)
NEUTS SEG # BLD: 2.1 K/UL (ref 1.8–8)
NEUTS SEG NFR BLD: 53 % (ref 40–73)
NITRITE UR QL STRIP.AUTO: NEGATIVE
NRBC # BLD: 0 K/UL (ref 0–0.01)
NRBC BLD-RTO: 0 PER 100 WBC
PH UR STRIP: 5.5 (ref 5–8)
PLATELET # BLD AUTO: 265 K/UL (ref 135–420)
PMV BLD AUTO: 10.3 FL (ref 9.2–11.8)
POTASSIUM SERPL-SCNC: 4.5 MMOL/L (ref 3.5–5.5)
PROT UR STRIP-MCNC: NEGATIVE MG/DL
RBC # BLD AUTO: 5.27 M/UL (ref 4.2–5.3)
RBC #/AREA URNS HPF: ABNORMAL /HPF (ref 0–5)
SODIUM SERPL-SCNC: 136 MMOL/L (ref 136–145)
SP GR UR REFRACTOMETRY: 1.01 (ref 1–1.03)
UROBILINOGEN UR QL STRIP.AUTO: 0.2 EU/DL (ref 0.2–1)
WBC # BLD AUTO: 4 K/UL (ref 4.6–13.2)
WBC URNS QL MICRO: NEGATIVE /HPF (ref 0–4)

## 2023-06-29 PROCEDURE — 72110 X-RAY EXAM L-2 SPINE 4/>VWS: CPT

## 2023-06-29 PROCEDURE — 83735 ASSAY OF MAGNESIUM: CPT

## 2023-06-29 PROCEDURE — 36415 COLL VENOUS BLD VENIPUNCTURE: CPT

## 2023-06-29 PROCEDURE — 85025 COMPLETE CBC W/AUTO DIFF WBC: CPT

## 2023-06-29 PROCEDURE — 99214 OFFICE O/P EST MOD 30 MIN: CPT | Performed by: FAMILY MEDICINE

## 2023-06-29 PROCEDURE — 87086 URINE CULTURE/COLONY COUNT: CPT

## 2023-06-29 PROCEDURE — 81001 URINALYSIS AUTO W/SCOPE: CPT

## 2023-06-29 PROCEDURE — 85652 RBC SED RATE AUTOMATED: CPT

## 2023-06-29 PROCEDURE — 80048 BASIC METABOLIC PNL TOTAL CA: CPT

## 2023-06-29 ASSESSMENT — PATIENT HEALTH QUESTIONNAIRE - PHQ9
SUM OF ALL RESPONSES TO PHQ9 QUESTIONS 1 & 2: 0
SUM OF ALL RESPONSES TO PHQ QUESTIONS 1-9: 0
2. FEELING DOWN, DEPRESSED OR HOPELESS: 0
1. LITTLE INTEREST OR PLEASURE IN DOING THINGS: 0

## 2023-06-30 LAB
BACTERIA SPEC CULT: NORMAL
SERVICE CMNT-IMP: NORMAL